# Patient Record
Sex: FEMALE | Race: WHITE | URBAN - METROPOLITAN AREA
[De-identification: names, ages, dates, MRNs, and addresses within clinical notes are randomized per-mention and may not be internally consistent; named-entity substitution may affect disease eponyms.]

---

## 2023-01-26 ENCOUNTER — OFFICE VISIT (OUTPATIENT)
Dept: OBGYN CLINIC | Age: 33
End: 2023-01-26
Payer: COMMERCIAL

## 2023-01-26 VITALS — WEIGHT: 180 LBS | BODY MASS INDEX: 26.58 KG/M2 | DIASTOLIC BLOOD PRESSURE: 78 MMHG | SYSTOLIC BLOOD PRESSURE: 126 MMHG

## 2023-01-26 DIAGNOSIS — Z30.432 ENCOUNTER FOR IUD REMOVAL: Primary | ICD-10-CM

## 2023-01-26 RX ORDER — LEVETIRACETAM 750 MG/1
1500 TABLET, EXTENDED RELEASE ORAL AT BEDTIME
COMMUNITY
Start: 2023-01-25

## 2023-01-26 NOTE — PROGRESS NOTES
RV    Chief Complaint   Patient presents with    Removal Iud      Jeannie Weiner is a 27 y.o.  who presents for IUD removal. She and her  Ying Mary are planning on starting a family. She did established care with a neurologist in [facility]. Restarted Keppra (750mg XR x2). Currently taking PNV and taking extra folic acid (2-3 mg). Visit Vitals  /78 (BP 1 Location: Left upper arm, BP Patient Position: Sitting)   Wt 180 lb (81.6 kg)   BMI 26.58 kg/m²     See note below for exam details. Discussed PNV, continue extra folic acid. Discussed risks of AED in pregnancy. Need for checking levels (neurologist note in Care Everywhere). Will plan extra ultrasounds to ensure normal growth throughout pregnancy. Theador Cushing, MD          IUD REMOVAL    Chart reviewed for the following:  I have reviewed the History & Physical and updated Jeannie Weiner allergies. TIME OUT performed immediately prior to start of procedure:  I performed the following reviews on Jeannie Weiner prior to the start of the procedure:  Patient was identified by name and date of birth   Agreement on procedure being performed was verified  Risks and Benefits explained to the patient  Consent was signed and verified  Time: 9:20AM  Date of procedure: 2023  Procedure performed by:  Dr. Michael Shaffer  How tolerated by patient: Well  Post Procedural Pain Scale: 2-Hurts Minimally  Comments: None    Indications for Removal:  Jeannie Weiner is a ,  27 y.o. female [address] [facility] [place], [place],[address] whose No LMP recorded. (Menstrual status: IUD). Oanh Vo who presents today for IUD removal. Her current IUD was placed about 6 years ago. She requests removal of the IUD because they would like to try and conceive. The IUD removal procedure was discussed with the patient and she had no further questions. Procedure: The patient was placed in a dorsal lithotomy position and appropriately draped.  On bimanual exam the uterus was anterior and normal in size with no tenderness present. A speculum exam was performed and the cervix was visualized. The IUD string was visualized. Using ring forceps , the string was grasped and the IUD removed intact. The IUD was shown to the patient.      Celeste Walters MD

## 2023-03-23 ENCOUNTER — ROUTINE PRENATAL (OUTPATIENT)
Dept: OBGYN CLINIC | Age: 33
End: 2023-03-23

## 2023-03-23 VITALS
SYSTOLIC BLOOD PRESSURE: 120 MMHG | WEIGHT: 176 LBS | DIASTOLIC BLOOD PRESSURE: 76 MMHG | HEIGHT: 69 IN | BODY MASS INDEX: 26.07 KG/M2

## 2023-03-23 DIAGNOSIS — Z34.01 ENCOUNTER FOR SUPERVISION OF NORMAL FIRST PREGNANCY IN FIRST TRIMESTER: Primary | ICD-10-CM

## 2023-03-23 LAB
C. TRACHOMATIS, EXTERNAL RESULT: NEGATIVE
N. GONORRHOEAE, EXTERNAL RESULT: NEGATIVE

## 2023-03-23 NOTE — PROGRESS NOTES
Initial Prenatal Visit    CC: Amenorrhea, positive pregnancy test    HPI:  Bao Costa is a 27 y.o.  who presents for initial prenatal appointment. Since her LMP she has experienced some nausea but no vomiting. She denies dysuria, discharge, vaginal bleeding. She endorses breast tenderness. She presents today with her  Samm Evans. LMP history:  The date of her LMP is 2023 . Her last menstrual period was normal and her LMP is certain. She is dated based off her LMP. Ultrasound data:  She had an ultrasound performed today in the office which revealed a viable coreas pregnancy with a gestational age of 10 weeks 2 days giving an [address] of 10/24/2023. Per US today-TA ULTRASOUND PERFORMED  A SINGLE VIABLE 9W2D WITH HERIBERTO OF 10/24/2023 IUP IS SEEN WITH NORMAL CARDIAC RHYTHM. GESTATIONAL AGE BASED ON TODAYS ULTRASOUND. THERE APPEARS TO BE HYPOECHOIC AREA ADJACENT  TO THE GESTATIONAL [address] MEASURING 12 X 5 MM, POSSIBLE [address]. THERE APPEARS TO BE A FIBROID SEEN ML ANTERIOR PEDUNCULATED MEASURING 11 X 10 M. A NORMAL YOLK SAC IS SEEN. RIGHT OVARY APPEARS WITHIN NORMAL LIMITS. LEFT OVARY APPEARS WITHIN NORMAL LIMITS. NO FREE FLUID IS SEEN IN THE CDS. Past pregnancy history:  First pregnancy. _ _ _ _ _ _ _ _ _ _ _ _ _ _ _ _ _ _ _ _ _ _ _ _ _ _ _ _ _ _ _ _     Substance history: Negative for alcohol, tobacco and street drugs. Positive for nothing. Exposure history: There are not  indoor cats in the home. The patient was instructed to not change the cat litter. She admits close contact with children on a regular basis. She has had chicken pox or the vaccine in the past.   Patient denies issues with domestic violence. Genetic Screening/Teratology Counseling: (Includes patient, baby's father, or anyone in either family with:)  3.  Patient's age >/= 28 at EDC?--yes  2.   Thalassemia ([facility] [place], [address], or  background): MCV<80?--no.     3.  Neural tube defect (meningomyelocele, spina bifida, anencephaly)?--no.   4.  Congenital heart defect?--no.  5.  Down syndrome?--no.   6.  Alexis-Sachs (Mormon, [place])?--no.   7.  Canavan's Disease?--no.   8.  Familial Dysautonomia?--no.   9.  Sickle cell disease or trait ()? --no   The patient has not been tested for sickle trait  10. Hemophilia or other blood disorders?--no. 11.  Muscular dystrophy?--no. 12.  Cystic fibrosis?--no. 13.  Henry's Chorea?--no. 14.  Mental retardation/autism (if yes was person tested for Fragile X)?--no. 15.  Other inherited genetic or chromosomal disorder?--no. 12.  Maternal metabolic disorder (DM, PKU, etc)?--no. 17.  Patient or FOB with a child with a birth defect not listed above?--no.  17a. Patient or FOB with a birth defect themselves?--no. 18.  Recurrent pregnancy loss, or stillbirth?--no. 19.  Any medications since LMP other than prenatal vitamins (include vitamins, supplements, OTC meds, drugs, alcohol)? --yes  20. Any other genetic/environmental exposure to discuss?--no. Infection History:  1. Lives with someone with TB or TB exposed?--no.   2.  Patient or partner has history of genital herpes?--no.  3.  Rash or viral illness since LMP?--no.    4.  History of STD (GC, CT, HPV, syphilis, HIV)? --no   5. Other: OTHER?       Past Medical History:   Diagnosis Date    Encounter for insertion of copper IUD     Placed in 2017    Encounter for IUD removal 2023    Epilepsy ([facility] [address]      Past Surgical History:   Procedure Laterality Date    HX WISDOM TEETH EXTRACTION       Social History     Occupational History    Not on file   Tobacco Use    Smoking status: Former     Types: Cigars    Smokeless tobacco: Never   Vaping Use    Vaping Use: Never used   Substance and Sexual Activity    Alcohol use: Not Currently    Drug use: Not Currently     Types: Marijuana    Sexual activity: Yes     Partners: Male     Family History   Adopted: Yes     OB History    Para Term  AB Living   1 0 0 0 0 0   SAB IAB Ectopic Molar Multiple Live Births   0 0 0 0 0 0      # Outcome Date GA Lbr Margarito/2nd Weight Sex Delivery Anes PTL Lv   1 Current              Allergies   Allergen Reactions    Other Medication Seizures     Hormonal birth control. Prior to Admission medications    Medication Sig Start Date End Date Taking? Authorizing Provider   levETIRAcetam (KEPPRA XR) 750 mg ER tablet Take 1,500 mg by mouth At bedtime. 23  Yes Provider, Historical   PNV EZ.85/VPTZHXY fum/folic ac (PRENATAL PO) Take  by mouth. Yes Provider, Historical        Review of Systems: History obtained from the patient  Constitutional: negative for weight loss, fever, night sweats  HEENT: negative for hearing loss, earache, congestion, snoring, sore throat  CV: negative for chest pain, palpitations, edema  Resp: negative for cough, shortness of breath, wheezing  Breast: negative for breast lumps, nipple discharge, galactorrhea  GI: negative for change in bowel habits, abdominal pain, black or bloody stools  : negative for frequency, dysuria, hematuria, vaginal discharge  MSK: negative for back pain, joint pain, muscle pain  Skin: negative for itching, rash, hives  Neuro: negative for dizziness, headache, confusion, weakness  Psych: negative for anxiety, depression, change in mood  Heme/lymph: negative for bleeding, bruising, pallor    Objective:  Visit Vitals  /76 (BP 1 Location: Left upper arm, BP Patient Position: Sitting)   Ht 5' 9\" (1.753 m)   Wt 176 lb (79.8 kg)   LMP 2023 (Exact Date)   BMI 25.99 kg/m²       Physical Exam:     Constitutional  Appearance: well-nourished, well developed, alert, in no acute distress    HENT  Head  Face: appears normal  Eyes: appear normal  Ears: normal  Mouth: normal  Lips: no lesions      Chest  Respiratory Effort: breathing unlabored     Cardiovascular  Heart:   Auscultation: regular rate and rhythm without murmur      Gastrointestinal  Abdominal Examination: abdomen non-tender to palpation, normal bowel sounds, no masses present  Liver and spleen: no hepatomegaly present, spleen not palpable  Hernias: no hernias identified    Genitourinary deferred    Skin  General Inspection: no rash, no lesions identified    Neurologic/Psychiatric  Mental Status:  Orientation: grossly oriented to person, place and time  Mood and Affect: mood normal, affect appropriate      Assessment and Plan:     Kun Rae is a 27 y.o.  with pregnancy problems as follows:    Catamenial epilepsy  - Follow[facility]y at [facility]. Last seizure in . Was restarted on Keppra when planning pregnancy (previously discontinued 6 years ago). Continue Keppra 750 mg BID  - Recommend growth scans in 3T  - Continue folic acid supplementation    Intrauterine pregnancy with issues addressed in problem list.  We discussed genetic testing screening options for the pregnancy and offered NIPT and the patient accepts. We discussed carrier screening as per ACOG guidelines for CF, SMA, DMD, and Fragile X syndrome and the patient declines carrier screening. Course of pregnancy discussed including visit schedule, routine U/S, glucola testing, etc.  Avoid alcoholic beverages and illicit/recreational drugs use. Take prenatal vitamins or folic acid daily. Hospital and practice style discussed with coverage system. Discussed nutrition, toxoplasmosis precautions, sexual activity, exercise, need for influenza vaccine, environmental and work hazards, travel advice, screen for domestic violence, need for seat belts. Discussed seafood, unpasteurized dairy products, deli meat, artificial sweeteners, and caffeine. Discussed current prescription drug use. Given medication list.  Discussed the use of over the counter medications and chemicals. Route of delivery discussed, including risks, benefits  Handouts given to pt. RTO 4 weeks.     Pop Walker MD

## 2023-03-25 LAB
BACTERIA SPEC CULT: NORMAL
C TRACH RRNA SPEC QL NAA+PROBE: NEGATIVE
N GONORRHOEA RRNA SPEC QL NAA+PROBE: NEGATIVE
SERVICE CMNT-IMP: NORMAL
T VAGINALIS RRNA SPEC QL NAA+PROBE: NEGATIVE

## 2023-04-13 LAB
ABO, EXTERNAL RESULT: NORMAL
ABO, EXTERNAL RESULT: NORMAL
HEP B, EXTERNAL RESULT: NEGATIVE
HIV, EXTERNAL RESULT: NEGATIVE
RUBELLA TITER, EXTERNAL RESULT: NORMAL
T. PALLIDUM (SYPHILIS) ANTIBODY, EXTERNAL RESULT: NORMAL

## 2023-05-09 PROBLEM — Z34.01 ENCOUNTER FOR SUPERVISION OF NORMAL FIRST PREGNANCY IN FIRST TRIMESTER: Status: ACTIVE | Noted: 2023-03-23

## 2023-05-11 ENCOUNTER — ROUTINE PRENATAL (OUTPATIENT)
Age: 33
End: 2023-05-11

## 2023-05-11 VITALS — WEIGHT: 186 LBS | SYSTOLIC BLOOD PRESSURE: 121 MMHG | BODY MASS INDEX: 27.47 KG/M2 | DIASTOLIC BLOOD PRESSURE: 70 MMHG

## 2023-05-11 DIAGNOSIS — Z34.82 ENCOUNTER FOR SUPERVISION OF OTHER NORMAL PREGNANCY IN SECOND TRIMESTER: Primary | ICD-10-CM

## 2023-05-11 DIAGNOSIS — Z34.01 ENCOUNTER FOR SUPERVISION OF NORMAL FIRST PREGNANCY IN FIRST TRIMESTER: ICD-10-CM

## 2023-05-11 PROCEDURE — 0502F SUBSEQUENT PRENATAL CARE: CPT | Performed by: OBSTETRICS & GYNECOLOGY

## 2023-05-11 RX ORDER — FOLIC ACID 1 MG/1
1 TABLET ORAL DAILY
COMMUNITY

## 2023-05-11 NOTE — PROGRESS NOTES
Subjective: Doing well. Denies VB, LOF. Feeling very tired. Questions about possibility of taking time off work closer to delivery, questions about family vaccinations    /70   Wt 186 lb (84.4 kg)   LMP 01/13/2023 (Exact Date)   BMI 27.47 kg/m²     Prenatal Fetal Information  Fetal HR: 150's  Movement: Absent    A/P  Hoang Leonie is a 27 y.o. Tommye Filter at [place] [address] with pregnancy complicated by:   - RV for FAS on 6/8    Patient Active Problem List    Diagnosis Date Noted    Encounter for supervision of normal first pregnancy in first trimester 03/23/2023     Primary Provider: Masha GASTELUM  EDC by FTUS=LMP    Pregnancy problems:  - Hx catamenial epilepsy, last seizure in 2014. On Keppra 750 mg BID (restarted prior to pregnancy)   [ ] Growth ultrasound in 3T    Routine OB:  - PNLs: A+/absc neg, 12.6/38.8, Rub Imm, HIV NR, Hep B neg, Hep C NR, VZV imm, RPR NR, G/C/T neg, pap NILM/HPV positive, HR other, urine cx NG  - Genetic Screening: NIPT low risk, GIRL!  - Anatomy scan: 6/8 at [address]: [ ] TDAP   - 1 hr GTT:   - Third Tri Labs:  - GBS / Fetal presentation    Pain mgmt. 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  She is adopted and does not know family history            Gamal Connelly MD

## 2023-06-08 ENCOUNTER — ROUTINE PRENATAL (OUTPATIENT)
Age: 33
End: 2023-06-08

## 2023-06-08 VITALS — WEIGHT: 193 LBS | BODY MASS INDEX: 28.5 KG/M2 | SYSTOLIC BLOOD PRESSURE: 122 MMHG | DIASTOLIC BLOOD PRESSURE: 84 MMHG

## 2023-06-08 DIAGNOSIS — Z34.01 ENCOUNTER FOR SUPERVISION OF NORMAL FIRST PREGNANCY IN FIRST TRIMESTER: Primary | ICD-10-CM

## 2023-06-08 PROCEDURE — 0502F SUBSEQUENT PRENATAL CARE: CPT | Performed by: OBSTETRICS & GYNECOLOGY

## 2023-06-08 NOTE — PROGRESS NOTES
Subjective: Doing well. Patient notes some light pink spotting when wiping one time. No leakage of fluid. Patient notes she is not feeling fetal movement yet. Heart rate was elevated while dealing with a sinus infection, feels like it has since come back down. /84   Wt 193 lb (87.5 kg)   LMP 01/13/2023 (Exact Date)   BMI 28.50 kg/m²     Prenatal Fetal Information  Fetal HR: US  Movement: Absent    A SINGLE VIABLE IUP AT 20W6D IS SEEN. FETAL CARDIAC MOTION OBSERVED. FETAL ANATOMY WAS WELL VISUALIZED AND APPEARS WNL. APPROPRIATE GROWTH MEASURED. SIZE = DATES. TAYLOR, PLACENTA AND CERVIX APPEAR WITHIN NORMAL LIMITS. GENDER: FEMALE. THERE APPEARS TO BE A FIBROID VISUALIZED SUBSEROSAL LT ANTERIOR MEASURING 25 X 19 X 26MM. A/P  Stephanie Osborne is a 27 y.o. Manuela Plan at 20w6d with pregnancy complicated by:       Patient Active Problem List    Diagnosis Date Noted    Encounter for supervision of normal first pregnancy in first trimester 03/23/2023     Primary Provider: Ishmael GASTELUM  EDC by FTUS=LMP    Pregnancy problems:  - Hx catamenial epilepsy, last seizure in 2014. On Keppra 750 mg BID (restarted prior to pregnancy)   [ ] Growth ultrasound in 3T    Routine OB:  - PNLs: A+/absc neg, 12.6/38.8, Rub Imm, HIV NR, Hep B neg, Hep C NR, VZV imm, RPR NR, G/C/T neg, pap NILM/HPV positive, HR other, urine cx NG  - Genetic Screening: NIPT low risk, GIRL!  - Anatomy scan: Normal female anatomy, anterior placenta  - Vaccines: [ ] TDAP   - 1 hr GTT:   - Third Tri Labs:  - GBS / Fetal presentation    Pain mgmt. 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  She is adopted and does not know family history            Anni Beach MD

## 2023-07-06 ENCOUNTER — ROUTINE PRENATAL (OUTPATIENT)
Age: 33
End: 2023-07-06

## 2023-07-06 VITALS — DIASTOLIC BLOOD PRESSURE: 80 MMHG | WEIGHT: 203 LBS | BODY MASS INDEX: 29.98 KG/M2 | SYSTOLIC BLOOD PRESSURE: 124 MMHG

## 2023-07-06 DIAGNOSIS — Z34.02 ENCOUNTER FOR SUPERVISION OF NORMAL FIRST PREGNANCY IN SECOND TRIMESTER: Primary | ICD-10-CM

## 2023-07-06 LAB
ABO + RH BLD: NORMAL
BLOOD BANK CMNT PATIENT-IMP: NORMAL
BLOOD GROUP ANTIBODIES SERPL: NORMAL
SPECIMEN EXP DATE BLD: NORMAL

## 2023-07-06 PROCEDURE — 0502F SUBSEQUENT PRENATAL CARE: CPT | Performed by: OBSTETRICS & GYNECOLOGY

## 2023-07-07 LAB
ERYTHROCYTE [DISTWIDTH] IN BLOOD BY AUTOMATED COUNT: 12.8 % (ref 11.5–14.5)
FERRITIN SERPL-MCNC: 24 NG/ML (ref 26–388)
GLUCOSE 1H P 100 G GLC PO SERPL-MCNC: 121 MG/DL (ref 65–140)
HCT VFR BLD AUTO: 35.1 % (ref 35–47)
HGB BLD-MCNC: 11.7 G/DL (ref 11.5–16)
HIV 1+2 AB+HIV1 P24 AG SERPL QL IA: NONREACTIVE
HIV 1/2 RESULT COMMENT: NORMAL
MCH RBC QN AUTO: 30.9 PG (ref 26–34)
MCHC RBC AUTO-ENTMCNC: 33.3 G/DL (ref 30–36.5)
MCV RBC AUTO: 92.6 FL (ref 80–99)
NRBC # BLD: 0 K/UL (ref 0–0.01)
NRBC BLD-RTO: 0 PER 100 WBC
PLATELET # BLD AUTO: 233 K/UL (ref 150–400)
PMV BLD AUTO: 10 FL (ref 8.9–12.9)
RBC # BLD AUTO: 3.79 M/UL (ref 3.8–5.2)
T PALLIDUM AB SER QL IA: NON REACTIVE
WBC # BLD AUTO: 11.3 K/UL (ref 3.6–11)

## 2023-07-27 ENCOUNTER — ROUTINE PRENATAL (OUTPATIENT)
Age: 33
End: 2023-07-27
Payer: COMMERCIAL

## 2023-07-27 VITALS — WEIGHT: 211 LBS | BODY MASS INDEX: 31.16 KG/M2 | SYSTOLIC BLOOD PRESSURE: 120 MMHG | DIASTOLIC BLOOD PRESSURE: 80 MMHG

## 2023-07-27 DIAGNOSIS — Z23 ENCOUNTER FOR IMMUNIZATION: ICD-10-CM

## 2023-07-27 DIAGNOSIS — Z34.02 ENCOUNTER FOR SUPERVISION OF NORMAL FIRST PREGNANCY IN SECOND TRIMESTER: Primary | ICD-10-CM

## 2023-07-27 DIAGNOSIS — Z34.01 ENCOUNTER FOR SUPERVISION OF NORMAL FIRST PREGNANCY IN FIRST TRIMESTER: ICD-10-CM

## 2023-07-27 PROCEDURE — 0502F SUBSEQUENT PRENATAL CARE: CPT | Performed by: OBSTETRICS & GYNECOLOGY

## 2023-07-27 PROCEDURE — 90715 TDAP VACCINE 7 YRS/> IM: CPT | Performed by: OBSTETRICS & GYNECOLOGY

## 2023-07-27 PROCEDURE — 90471 IMMUNIZATION ADMIN: CPT | Performed by: OBSTETRICS & GYNECOLOGY

## 2023-08-17 ENCOUNTER — ROUTINE PRENATAL (OUTPATIENT)
Age: 33
End: 2023-08-17

## 2023-08-17 VITALS — DIASTOLIC BLOOD PRESSURE: 82 MMHG | SYSTOLIC BLOOD PRESSURE: 124 MMHG | WEIGHT: 214 LBS | BODY MASS INDEX: 31.6 KG/M2

## 2023-08-17 DIAGNOSIS — Z34.03 ENCOUNTER FOR SUPERVISION OF NORMAL FIRST PREGNANCY IN THIRD TRIMESTER: Primary | ICD-10-CM

## 2023-08-17 PROCEDURE — 0502F SUBSEQUENT PRENATAL CARE: CPT | Performed by: OBSTETRICS & GYNECOLOGY

## 2023-08-31 ENCOUNTER — ROUTINE PRENATAL (OUTPATIENT)
Age: 33
End: 2023-08-31

## 2023-08-31 VITALS — SYSTOLIC BLOOD PRESSURE: 124 MMHG | WEIGHT: 214 LBS | DIASTOLIC BLOOD PRESSURE: 76 MMHG | BODY MASS INDEX: 31.6 KG/M2

## 2023-08-31 DIAGNOSIS — Z34.03 ENCOUNTER FOR SUPERVISION OF NORMAL FIRST PREGNANCY IN THIRD TRIMESTER: Primary | ICD-10-CM

## 2023-08-31 PROCEDURE — 0502F SUBSEQUENT PRENATAL CARE: CPT | Performed by: OBSTETRICS & GYNECOLOGY

## 2023-09-11 ENCOUNTER — TELEPHONE (OUTPATIENT)
Age: 33
End: 2023-09-11

## 2023-09-14 ENCOUNTER — ROUTINE PRENATAL (OUTPATIENT)
Age: 33
End: 2023-09-14

## 2023-09-14 VITALS — BODY MASS INDEX: 33.08 KG/M2 | DIASTOLIC BLOOD PRESSURE: 82 MMHG | WEIGHT: 224 LBS | SYSTOLIC BLOOD PRESSURE: 120 MMHG

## 2023-09-14 DIAGNOSIS — Z34.03 ENCOUNTER FOR SUPERVISION OF NORMAL FIRST PREGNANCY IN THIRD TRIMESTER: Primary | ICD-10-CM

## 2023-09-14 PROCEDURE — 0502F SUBSEQUENT PRENATAL CARE: CPT | Performed by: OBSTETRICS & GYNECOLOGY

## 2023-09-25 SDOH — ECONOMIC STABILITY: TRANSPORTATION INSECURITY
IN THE PAST 12 MONTHS, HAS LACK OF TRANSPORTATION KEPT YOU FROM MEETINGS, WORK, OR FROM GETTING THINGS NEEDED FOR DAILY LIVING?: NO

## 2023-09-25 SDOH — ECONOMIC STABILITY: FOOD INSECURITY: WITHIN THE PAST 12 MONTHS, YOU WORRIED THAT YOUR FOOD WOULD RUN OUT BEFORE YOU GOT MONEY TO BUY MORE.: NEVER TRUE

## 2023-09-25 SDOH — ECONOMIC STABILITY: INCOME INSECURITY: HOW HARD IS IT FOR YOU TO PAY FOR THE VERY BASICS LIKE FOOD, HOUSING, MEDICAL CARE, AND HEATING?: SOMEWHAT HARD

## 2023-09-25 SDOH — ECONOMIC STABILITY: FOOD INSECURITY: WITHIN THE PAST 12 MONTHS, THE FOOD YOU BOUGHT JUST DIDN'T LAST AND YOU DIDN'T HAVE MONEY TO GET MORE.: NEVER TRUE

## 2023-09-25 SDOH — ECONOMIC STABILITY: HOUSING INSECURITY
IN THE LAST 12 MONTHS, WAS THERE A TIME WHEN YOU DID NOT HAVE A STEADY PLACE TO SLEEP OR SLEPT IN A SHELTER (INCLUDING NOW)?: NO

## 2023-09-28 ENCOUNTER — ROUTINE PRENATAL (OUTPATIENT)
Age: 33
End: 2023-09-28

## 2023-09-28 VITALS — DIASTOLIC BLOOD PRESSURE: 72 MMHG | SYSTOLIC BLOOD PRESSURE: 112 MMHG | BODY MASS INDEX: 34.17 KG/M2 | WEIGHT: 231.4 LBS

## 2023-09-28 DIAGNOSIS — Z36.9 UNSPECIFIED ANTENATAL SCREENING: Primary | ICD-10-CM

## 2023-09-28 DIAGNOSIS — Z3A.36 36 WEEKS GESTATION OF PREGNANCY: ICD-10-CM

## 2023-09-28 DIAGNOSIS — Z34.03 ENCOUNTER FOR SUPERVISION OF NORMAL FIRST PREGNANCY IN THIRD TRIMESTER: ICD-10-CM

## 2023-09-28 LAB — GBS, EXTERNAL RESULT: NEGATIVE

## 2023-09-28 PROCEDURE — 0502F SUBSEQUENT PRENATAL CARE: CPT | Performed by: ADVANCED PRACTICE MIDWIFE

## 2023-09-28 NOTE — PROGRESS NOTES
OB Visit:    + fetal movement, + contractions, GBS today, leaking fluid, unsure if it is urine or AF  Nitrazine, Fern and Pool all Neg  Reassured  Some [place] Mahmood  GBS collected today  1+ TASHA, No S/Sx Pre-E

## 2023-09-30 LAB
GP B STREP DNA SPEC QL NAA+PROBE: NEGATIVE
SPECIMEN SOURCE: NORMAL

## 2023-10-05 ENCOUNTER — ROUTINE PRENATAL (OUTPATIENT)
Age: 33
End: 2023-10-05

## 2023-10-05 VITALS — SYSTOLIC BLOOD PRESSURE: 118 MMHG | WEIGHT: 231 LBS | BODY MASS INDEX: 34.11 KG/M2 | DIASTOLIC BLOOD PRESSURE: 74 MMHG

## 2023-10-05 DIAGNOSIS — Z3A.37 37 WEEKS GESTATION OF PREGNANCY: ICD-10-CM

## 2023-10-05 DIAGNOSIS — Z34.03 ENCOUNTER FOR SUPERVISION OF NORMAL FIRST PREGNANCY IN THIRD TRIMESTER: Primary | ICD-10-CM

## 2023-10-05 DIAGNOSIS — Z34.01 ENCOUNTER FOR SUPERVISION OF NORMAL FIRST PREGNANCY IN FIRST TRIMESTER: ICD-10-CM

## 2023-10-05 PROCEDURE — 0502F SUBSEQUENT PRENATAL CARE: CPT

## 2023-10-05 NOTE — PROGRESS NOTES
NELSON at 37w6d. Doing well. FM+, Denies LOF/VB/cxns. Pt inquiring about flu and covid vaccine recommendations. Reviewed that they are recommended during pregnancy. Pt declines her flu shot today. Pt concerned about breast feeding and Keppra use. Reviewed that current studies have found that the amount of Keppra the pt is taking is an acceptable amount to take and breast feed. However, encouraged pt to speak with neurologist about need to continue kepra post partum. In addition, plan to speak with lactation team and watch for infant side effects. RTO 1 wk.     PO Ricketts CNM

## 2023-10-12 ENCOUNTER — ROUTINE PRENATAL (OUTPATIENT)
Age: 33
End: 2023-10-12

## 2023-10-12 VITALS — BODY MASS INDEX: 34.73 KG/M2 | WEIGHT: 235.2 LBS | SYSTOLIC BLOOD PRESSURE: 118 MMHG | DIASTOLIC BLOOD PRESSURE: 78 MMHG

## 2023-10-12 DIAGNOSIS — Z34.01 ENCOUNTER FOR SUPERVISION OF NORMAL FIRST PREGNANCY IN FIRST TRIMESTER: Primary | ICD-10-CM

## 2023-10-12 NOTE — PROGRESS NOTES
OB Visit:    + fetal movement, denies contractions, no medical complaints at this time. Questions about keppra and breast feeding. Worried about milk supply. Labor precautions reviewed. Post dates management reviewed.   NELSON 1 week

## 2023-10-19 ENCOUNTER — ROUTINE PRENATAL (OUTPATIENT)
Age: 33
End: 2023-10-19

## 2023-10-19 VITALS — WEIGHT: 238 LBS | SYSTOLIC BLOOD PRESSURE: 130 MMHG | DIASTOLIC BLOOD PRESSURE: 88 MMHG | BODY MASS INDEX: 35.15 KG/M2

## 2023-10-19 DIAGNOSIS — Z34.01 ENCOUNTER FOR SUPERVISION OF NORMAL FIRST PREGNANCY IN FIRST TRIMESTER: Primary | ICD-10-CM

## 2023-10-25 ENCOUNTER — HOSPITAL ENCOUNTER (INPATIENT)
Facility: HOSPITAL | Age: 33
LOS: 5 days | Discharge: HOME | End: 2023-10-30
Attending: OBSTETRICS & GYNECOLOGY | Admitting: OBSTETRICS & GYNECOLOGY
Payer: COMMERCIAL

## 2023-10-25 PROBLEM — Z34.90 ENCOUNTER FOR ELECTIVE INDUCTION OF LABOR: Status: ACTIVE | Noted: 2023-10-25

## 2023-10-25 PROCEDURE — 1100000000 HC RM PRIVATE

## 2023-10-25 PROCEDURE — 6370000000 HC RX 637 (ALT 250 FOR IP): Performed by: OBSTETRICS & GYNECOLOGY

## 2023-10-25 PROCEDURE — 59200 INSERT CERVICAL DILATOR: CPT

## 2023-10-25 PROCEDURE — C1726 CATH, BAL DIL, NON-VASCULAR: HCPCS

## 2023-10-25 PROCEDURE — 7210000100 HC LABOR FEE PER 1 HR

## 2023-10-25 RX ORDER — SODIUM CHLORIDE, SODIUM LACTATE, POTASSIUM CHLORIDE, CALCIUM CHLORIDE 600; 310; 30; 20 MG/100ML; MG/100ML; MG/100ML; MG/100ML
INJECTION, SOLUTION INTRAVENOUS CONTINUOUS
Status: DISCONTINUED | OUTPATIENT
Start: 2023-10-25 | End: 2023-10-27

## 2023-10-25 RX ORDER — METHYLERGONOVINE MALEATE 0.2 MG/ML
200 INJECTION INTRAVENOUS PRN
Status: DISCONTINUED | OUTPATIENT
Start: 2023-10-25 | End: 2023-10-27

## 2023-10-25 RX ORDER — CARBOPROST TROMETHAMINE 250 UG/ML
250 INJECTION, SOLUTION INTRAMUSCULAR PRN
Status: DISCONTINUED | OUTPATIENT
Start: 2023-10-25 | End: 2023-10-27

## 2023-10-25 RX ORDER — BUTORPHANOL TARTRATE 1 MG/ML
1 INJECTION, SOLUTION INTRAMUSCULAR; INTRAVENOUS
Status: DISCONTINUED | OUTPATIENT
Start: 2023-10-25 | End: 2023-10-25 | Stop reason: RX

## 2023-10-25 RX ORDER — MISOPROSTOL 200 UG/1
800 TABLET ORAL PRN
Status: DISCONTINUED | OUTPATIENT
Start: 2023-10-25 | End: 2023-10-27

## 2023-10-25 RX ORDER — SODIUM CHLORIDE, SODIUM LACTATE, POTASSIUM CHLORIDE, AND CALCIUM CHLORIDE .6; .31; .03; .02 G/100ML; G/100ML; G/100ML; G/100ML
500 INJECTION, SOLUTION INTRAVENOUS PRN
Status: DISCONTINUED | OUTPATIENT
Start: 2023-10-25 | End: 2023-10-27

## 2023-10-25 RX ORDER — SODIUM CHLORIDE 0.9 % (FLUSH) 0.9 %
5-40 SYRINGE (ML) INJECTION EVERY 12 HOURS SCHEDULED
Status: DISCONTINUED | OUTPATIENT
Start: 2023-10-25 | End: 2023-10-27

## 2023-10-25 RX ORDER — SODIUM CHLORIDE 9 MG/ML
25 INJECTION, SOLUTION INTRAVENOUS PRN
Status: DISCONTINUED | OUTPATIENT
Start: 2023-10-25 | End: 2023-10-27

## 2023-10-25 RX ORDER — ZOLPIDEM TARTRATE 5 MG/1
5 TABLET ORAL NIGHTLY PRN
Status: DISCONTINUED | OUTPATIENT
Start: 2023-10-25 | End: 2023-10-27

## 2023-10-25 RX ORDER — ONDANSETRON 2 MG/ML
4 INJECTION INTRAMUSCULAR; INTRAVENOUS EVERY 6 HOURS PRN
Status: DISCONTINUED | OUTPATIENT
Start: 2023-10-25 | End: 2023-10-27

## 2023-10-25 RX ORDER — FENTANYL CITRATE 50 UG/ML
25 INJECTION, SOLUTION INTRAMUSCULAR; INTRAVENOUS
Status: DISCONTINUED | OUTPATIENT
Start: 2023-10-25 | End: 2023-10-27

## 2023-10-25 RX ORDER — SODIUM CHLORIDE 0.9 % (FLUSH) 0.9 %
5-40 SYRINGE (ML) INJECTION PRN
Status: DISCONTINUED | OUTPATIENT
Start: 2023-10-25 | End: 2023-10-27

## 2023-10-25 RX ORDER — SODIUM CHLORIDE, SODIUM LACTATE, POTASSIUM CHLORIDE, AND CALCIUM CHLORIDE .6; .31; .03; .02 G/100ML; G/100ML; G/100ML; G/100ML
1000 INJECTION, SOLUTION INTRAVENOUS PRN
Status: DISCONTINUED | OUTPATIENT
Start: 2023-10-25 | End: 2023-10-27

## 2023-10-25 RX ADMIN — Medication 25 MCG: at 17:11

## 2023-10-25 RX ADMIN — Medication 50 MCG: at 21:20

## 2023-10-25 NOTE — H&P
History & Physical    Name: Hong Marte MRN: [id number]  SSN: [id number]    YOB: 1990  Age: 26 y.o. Sex: female        Subjective:     Estimated Date of Delivery: 10/20/23  OB History          1    Para   0    Term   0       0    AB   0    Living   0         SAB        IAB        Ectopic        Molar        Multiple        Live Births                    Ms. Demarcus Díaz is admitted with pregnancy at 40w5d for elective IOL. Her pregnancy has been complicated by a history of catamenial epilepsy. She has been well managed on Keppra 750 mg XR 2 tabs q AM (1500 mg total). Please see prenatal records for details. Patient Active Problem List    Diagnosis Date Noted    Encounter for elective induction of labor 10/25/2023    Encounter for supervision of normal first pregnancy in first trimester 2023     Primary Provider: Santiago Ortiz   EDC by FTUS=LMP    Pregnancy problems:  - Hx catamenial epilepsy, last seizure in . On Keppra 1500 mg daily -2 750 mg XR tablets at night (restarted prior to pregnancy)   [x] Growth ultrasound in 3T - 4#4oz at 32 weeks, 44%ile    Routine OB:  - PNLs: A+/absc neg, 12.6/38.8, Rub Imm, HIV NR, Hep B neg, Hep C NR, VZV imm, RPR NR, G/C/T neg, pap NILM/HPV positive, HR other, urine cx NG  - Genetic Screening: NIPT low risk, GIRL! \"Mary Valorie\"  - Anatomy scan: Normal female anatomy, anterior placenta  - Vaccines: [X] TDAP 2023  - 1 hr GTT: 121  - Third Tri Labs: 11.7/35.1  - GBS- Neg / Fetal presentation  DT Vtx by Leopolds    Pain mgmt. 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  She is adopted and does not know family history         Past Medical History:   Diagnosis Date    Encounter for insertion of copper IUD     Placed in     Encounter for IUD removal 2023    Epilepsy [facility])      Past Surgical History:   Procedure Laterality Date    WISDOM TOOTH EXTRACTION       Social

## 2023-10-25 NOTE — PROGRESS NOTES
10/25/2023  4:22 PM Patient arrived to LDR 9 accompanied by  for scheduled induction, elective. Patient endorses positive fetal movement and denies VB or LOF. Patient in good spirits, excited to start induction. Dr. Dave Mcguire at bedside to discuss POC. SVE, FT/50/-3. Attempted FB placement, unsuccessful. Verbal orders received to administer misoprostol  25mcg followed by 50 mcg, reattempt FB in morning, patient in agreement with plan. 0 Marina Bowman CNM at bedside to introduce self. Patient sitting up in bed eating dinner. Plan to attempt FB placement around 2200 after second dose of Misoprostol. EFM removed per CNM.

## 2023-10-26 ENCOUNTER — ANESTHESIA (OUTPATIENT)
Facility: HOSPITAL | Age: 33
End: 2023-10-26
Payer: COMMERCIAL

## 2023-10-26 ENCOUNTER — ANESTHESIA EVENT (OUTPATIENT)
Facility: HOSPITAL | Age: 33
End: 2023-10-26
Payer: COMMERCIAL

## 2023-10-26 LAB
ABO + RH BLD: NORMAL
ALBUMIN SERPL-MCNC: 2.7 G/DL (ref 3.5–5)
ALBUMIN/GLOB SERPL: 0.7 (ref 1.1–2.2)
ALP SERPL-CCNC: 143 U/L (ref 45–117)
ALT SERPL-CCNC: 13 U/L (ref 12–78)
ANION GAP SERPL CALC-SCNC: 9 MMOL/L (ref 5–15)
AST SERPL-CCNC: 14 U/L (ref 15–37)
BILIRUB SERPL-MCNC: 0.3 MG/DL (ref 0.2–1)
BLOOD GROUP ANTIBODIES SERPL: NORMAL
BUN SERPL-MCNC: 6 MG/DL (ref 6–20)
BUN/CREAT SERPL: 8 (ref 12–20)
CALCIUM SERPL-MCNC: 9.3 MG/DL (ref 8.5–10.1)
CHLORIDE SERPL-SCNC: 105 MMOL/L (ref 97–108)
CO2 SERPL-SCNC: 20 MMOL/L (ref 21–32)
CREAT SERPL-MCNC: 0.76 MG/DL (ref 0.55–1.02)
ERYTHROCYTE [DISTWIDTH] IN BLOOD BY AUTOMATED COUNT: 12.5 % (ref 11.5–14.5)
GLOBULIN SER CALC-MCNC: 3.8 G/DL (ref 2–4)
GLUCOSE SERPL-MCNC: 141 MG/DL (ref 65–100)
HCT VFR BLD AUTO: 35.4 % (ref 35–47)
HGB BLD-MCNC: 12.2 G/DL (ref 11.5–16)
MCH RBC QN AUTO: 30.3 PG (ref 26–34)
MCHC RBC AUTO-ENTMCNC: 34.5 G/DL (ref 30–36.5)
MCV RBC AUTO: 87.8 FL (ref 80–99)
NRBC # BLD: 0 K/UL (ref 0–0.01)
NRBC BLD-RTO: 0 PER 100 WBC
PLATELET # BLD AUTO: 222 K/UL (ref 150–400)
PMV BLD AUTO: 10.6 FL (ref 8.9–12.9)
POTASSIUM SERPL-SCNC: 3.6 MMOL/L (ref 3.5–5.1)
PROT SERPL-MCNC: 6.5 G/DL (ref 6.4–8.2)
RBC # BLD AUTO: 4.03 M/UL (ref 3.8–5.2)
SODIUM SERPL-SCNC: 134 MMOL/L (ref 136–145)
SPECIMEN EXP DATE BLD: NORMAL
WBC # BLD AUTO: 12.6 K/UL (ref 3.6–11)

## 2023-10-26 PROCEDURE — 7210000100 HC LABOR FEE PER 1 HR

## 2023-10-26 PROCEDURE — 4A1HXCZ MONITORING OF PRODUCTS OF CONCEPTION, CARDIAC RATE, EXTERNAL APPROACH: ICD-10-PCS | Performed by: OBSTETRICS & GYNECOLOGY

## 2023-10-26 PROCEDURE — 85027 COMPLETE CBC AUTOMATED: CPT

## 2023-10-26 PROCEDURE — 86850 RBC ANTIBODY SCREEN: CPT

## 2023-10-26 PROCEDURE — 3E033VJ INTRODUCTION OF OTHER HORMONE INTO PERIPHERAL VEIN, PERCUTANEOUS APPROACH: ICD-10-PCS | Performed by: OBSTETRICS & GYNECOLOGY

## 2023-10-26 PROCEDURE — 80053 COMPREHEN METABOLIC PANEL: CPT

## 2023-10-26 PROCEDURE — 6360000002 HC RX W HCPCS: Performed by: OBSTETRICS & GYNECOLOGY

## 2023-10-26 PROCEDURE — 86900 BLOOD TYPING SEROLOGIC ABO: CPT

## 2023-10-26 PROCEDURE — 36415 COLL VENOUS BLD VENIPUNCTURE: CPT

## 2023-10-26 PROCEDURE — 2580000003 HC RX 258: Performed by: OBSTETRICS & GYNECOLOGY

## 2023-10-26 PROCEDURE — 86901 BLOOD TYPING SEROLOGIC RH(D): CPT

## 2023-10-26 PROCEDURE — 6370000000 HC RX 637 (ALT 250 FOR IP): Performed by: ADVANCED PRACTICE MIDWIFE

## 2023-10-26 PROCEDURE — 1100000000 HC RM PRIVATE

## 2023-10-26 PROCEDURE — 6370000000 HC RX 637 (ALT 250 FOR IP): Performed by: OBSTETRICS & GYNECOLOGY

## 2023-10-26 PROCEDURE — 3E0P7VZ INTRODUCTION OF HORMONE INTO FEMALE REPRODUCTIVE, VIA NATURAL OR ARTIFICIAL OPENING: ICD-10-PCS | Performed by: OBSTETRICS & GYNECOLOGY

## 2023-10-26 RX ORDER — EPHEDRINE SULFATE 50 MG/ML
INJECTION INTRAVENOUS
Status: DISCONTINUED
Start: 2023-10-26 | End: 2023-10-27 | Stop reason: WASHOUT

## 2023-10-26 RX ORDER — NALOXONE HYDROCHLORIDE 0.4 MG/ML
INJECTION, SOLUTION INTRAMUSCULAR; INTRAVENOUS; SUBCUTANEOUS PRN
Status: DISCONTINUED | OUTPATIENT
Start: 2023-10-26 | End: 2023-10-27 | Stop reason: HOSPADM

## 2023-10-26 RX ORDER — BUPIVACAINE HYDROCHLORIDE 2.5 MG/ML
INJECTION, SOLUTION EPIDURAL; INFILTRATION; INTRACAUDAL
Status: COMPLETED
Start: 2023-10-26 | End: 2023-10-27

## 2023-10-26 RX ORDER — DIPHENHYDRAMINE HCL 25 MG
25 CAPSULE ORAL EVERY 6 HOURS PRN
Status: DISCONTINUED | OUTPATIENT
Start: 2023-10-26 | End: 2023-10-27 | Stop reason: HOSPADM

## 2023-10-26 RX ORDER — FENTANYL 0.2 MG/100ML-BUPIV 0.125%-NACL 0.9% EPIDURAL INJ 2/0.125 MCG/ML-%
1-15 SOLUTION INJECTION CONTINUOUS
Status: DISCONTINUED | OUTPATIENT
Start: 2023-10-27 | End: 2023-10-27

## 2023-10-26 RX ORDER — EPHEDRINE SULFATE 50 MG/ML
10 INJECTION INTRAVENOUS AS NEEDED
Status: DISCONTINUED | OUTPATIENT
Start: 2023-10-26 | End: 2023-10-27 | Stop reason: HOSPADM

## 2023-10-26 RX ORDER — DIPHENHYDRAMINE HYDROCHLORIDE 50 MG/ML
25 INJECTION INTRAMUSCULAR; INTRAVENOUS EVERY 6 HOURS PRN
Status: DISCONTINUED | OUTPATIENT
Start: 2023-10-26 | End: 2023-10-27 | Stop reason: HOSPADM

## 2023-10-26 RX ADMIN — ONDANSETRON HYDROCHLORIDE 4 MG: 2 INJECTION, SOLUTION INTRAMUSCULAR; INTRAVENOUS at 22:59

## 2023-10-26 RX ADMIN — Medication 50 MCG: at 13:57

## 2023-10-26 RX ADMIN — SODIUM CHLORIDE, POTASSIUM CHLORIDE, SODIUM LACTATE AND CALCIUM CHLORIDE: 600; 310; 30; 20 INJECTION, SOLUTION INTRAVENOUS at 19:59

## 2023-10-26 RX ADMIN — Medication 50 MCG: at 09:14

## 2023-10-26 RX ADMIN — ONDANSETRON HYDROCHLORIDE 4 MG: 2 INJECTION, SOLUTION INTRAMUSCULAR; INTRAVENOUS at 15:09

## 2023-10-26 RX ADMIN — ONDANSETRON HYDROCHLORIDE 4 MG: 2 INJECTION, SOLUTION INTRAMUSCULAR; INTRAVENOUS at 04:51

## 2023-10-26 RX ADMIN — OXYTOCIN 1 MILLI-UNITS/MIN: 10 INJECTION INTRAVENOUS at 19:59

## 2023-10-26 RX ADMIN — ZOLPIDEM TARTRATE 5 MG: 5 TABLET ORAL at 20:18

## 2023-10-26 NOTE — PROGRESS NOTES
1530:  Bedside and Verbal shift change report given to Juli Rivas RN (oncoming nurse) by Dilshad Atkinson RN (offgoing nurse). Report included the following information Nurse Handoff Report, MAR, and Recent Results. 1616:  Dr Riya Beatty at bedside. Foleyballoon deflated and removed. SVE 4/50/-2 1935:  Bedside and Verbal shift change report given to [facility] [address] (oncoming nurse) by Juli Rivas RN (offgoing nurse). Report included the following information Nurse Handoff Report, Intake/Output, MAR, and Recent Results.

## 2023-10-26 NOTE — PROGRESS NOTES
1950: Bedside and Verbal shift change report given to Migue North RN (oncoming nurse) by Danial Jama. Law RN (offgoing nurse). Report included the following information Nurse Handoff Report, Adult Overview, Intake/Output, MAR, Recent Results, and Event Log.      2051: Pt on EFM. 2120: Cytotec 50mcg given (see MAR). 2nd dose. 2252: AASHISH Rapp at the bedside discussing plan of care. Cook cath placed, 60/60. Pt off EFM per MD.     0628: Pt refused cytotec. Educated pt on benefits and reasons we use cytotec. Pt agrees to reassess. 5446-3248: Pt up to bathroom, reports vomiting and nausea. 0451: Pt still feeling nauseas. Zofran given (see MAR). Bedside and Verbal shift change report given to [address] [place] [address] (oncoming nurse) by Migue North RN (offgoing nurse). Report included the following information Nurse Handoff Report, Adult Overview, Intake/Output, MAR, Recent Results, and Event Log. [FreeTextEntry3] : Improving left lower extremity cellulitis, resolved [FreeTextEntry1] : 59 yr old [place] afib, lagb, sleeve , radio venous ablation left gsv, hh repair\par complexitymod- reviewed previous us/ labs, films- no xr only us\par us shows venous in right, left is still closed\par ann pad going back to see  dr Lehman\par risk low- t on ac \par time30\par  \par 7/15/21\par Chronic Lymphedema \par Venous Insuffciency with skin changes\par No open wounds on legs, hemosiderin staining present\par s/p resolving cellulitis left leg from 4/2020\par Works as RN - long shifts standing constantly, wearing 20-30 compression socks, feels uncomfortable at top of socks/bothersome, uncomfortable\par uses lymphedema pump 2x/day on days off\par s/p RFA left leg\par

## 2023-10-26 NOTE — PROGRESS NOTES
Bedside and Verbal shift change report given to MICHELLE Freire RN (oncoming nurse) by Xin Ojeda RN (offgoing nurse). Report included the following information Nurse Handoff Report. 1959- Pitocin started. 2132- 500mL fluid bolus. 2245- SROM clear fluid. 2250- Pt up to void. Starting to feel nauseous. 2259- zofran given. 2324- Consents for nitrous reviewed and signed. 2340- Pt asking about epidural, requesting SVE before epidural, SVE 4/50/-2. 500mL fluid bolus started. 2358- Anesthesia at bedside for epidural.    0140- Straight cath 200mL    0504- Straight cath 100mL    0520- Pt complaining of back pain spinning baby. 6642- Anesthesia at bedside for redose. 0730- Bedside and Verbal shift change report given to DEMETRI Khalil RN (oncoming nurse) by MICHELLE Freire RN (offgoing nurse). Report included the following information Nurse Handoff Report.

## 2023-10-26 NOTE — PROGRESS NOTES
0750 Bedside and Verbal shift change report given to Tiffanie Ness RN (oncoming nurse) by Artice Krabbe RN (offgoing nurse). Report included the following information Nurse Handoff Report, MAR, Recent Results, and Event Log.   0750 Dr Downs Officer in talking with patient regarding plan of care  0801 Monitor applied prior to cytotec  0914 Cytotec given  1000 Sleeping  1100 Still sleeping off and on  1140 Monitor off  1205 IV flushed  1327 Monitor reapplied prior to next dose of cytotec  1358 Cytotec 50mgc buccal  1509 Complained of nausea. Zofran given  1530 Bedside and Verbal shift change report given to Davey Tom RN (oncoming nurse) by Tiffanie Ness RN (offgoing nurse). Report included the following information Nurse Handoff Report, MAR, Recent Results, and Event Log.

## 2023-10-27 PROBLEM — O14.93 PRE-ECLAMPSIA IN THIRD TRIMESTER: Status: ACTIVE | Noted: 2023-10-27

## 2023-10-27 LAB
CREAT UR-MCNC: 94.2 MG/DL
PROT UR-MCNC: 50 MG/DL (ref 0–11.9)
PROT/CREAT UR-RTO: 0.5

## 2023-10-27 PROCEDURE — 6360000002 HC RX W HCPCS: Performed by: ANESTHESIOLOGY

## 2023-10-27 PROCEDURE — 59514 CESAREAN DELIVERY ONLY: CPT | Performed by: OBSTETRICS & GYNECOLOGY

## 2023-10-27 PROCEDURE — 3609079900 HC CESAREAN SECTION: Performed by: OBSTETRICS & GYNECOLOGY

## 2023-10-27 PROCEDURE — 1120000000 HC RM PRIVATE OB

## 2023-10-27 PROCEDURE — 3700000025 EPIDURAL BLOCK: Performed by: ANESTHESIOLOGY

## 2023-10-27 PROCEDURE — 3700000000 HC ANESTHESIA ATTENDED CARE: Performed by: OBSTETRICS & GYNECOLOGY

## 2023-10-27 PROCEDURE — 2709999900 HC NON-CHARGEABLE SUPPLY: Performed by: OBSTETRICS & GYNECOLOGY

## 2023-10-27 PROCEDURE — 59510 CESAREAN DELIVERY: CPT | Performed by: OBSTETRICS & GYNECOLOGY

## 2023-10-27 PROCEDURE — 7210000100 HC LABOR FEE PER 1 HR

## 2023-10-27 PROCEDURE — 6360000002 HC RX W HCPCS: Performed by: OBSTETRICS & GYNECOLOGY

## 2023-10-27 PROCEDURE — 7100000001 HC PACU RECOVERY - ADDTL 15 MIN

## 2023-10-27 PROCEDURE — 2580000003 HC RX 258: Performed by: OBSTETRICS & GYNECOLOGY

## 2023-10-27 PROCEDURE — 6360000002 HC RX W HCPCS: Performed by: STUDENT IN AN ORGANIZED HEALTH CARE EDUCATION/TRAINING PROGRAM

## 2023-10-27 PROCEDURE — 2580000003 HC RX 258: Performed by: NURSE ANESTHETIST, CERTIFIED REGISTERED

## 2023-10-27 PROCEDURE — 3700000156 HC EPIDURAL ANESTHESIA

## 2023-10-27 PROCEDURE — 6370000000 HC RX 637 (ALT 250 FOR IP): Performed by: OBSTETRICS & GYNECOLOGY

## 2023-10-27 PROCEDURE — 51701 INSERT BLADDER CATHETER: CPT

## 2023-10-27 PROCEDURE — 7100000000 HC PACU RECOVERY - FIRST 15 MIN: Performed by: OBSTETRICS & GYNECOLOGY

## 2023-10-27 PROCEDURE — 7100000000 HC PACU RECOVERY - FIRST 15 MIN

## 2023-10-27 PROCEDURE — 84156 ASSAY OF PROTEIN URINE: CPT

## 2023-10-27 PROCEDURE — 2500000003 HC RX 250 WO HCPCS: Performed by: OBSTETRICS & GYNECOLOGY

## 2023-10-27 PROCEDURE — 82570 ASSAY OF URINE CREATININE: CPT

## 2023-10-27 PROCEDURE — 6360000002 HC RX W HCPCS: Performed by: NURSE ANESTHETIST, CERTIFIED REGISTERED

## 2023-10-27 PROCEDURE — 0UQ90ZZ REPAIR UTERUS, OPEN APPROACH: ICD-10-PCS | Performed by: OBSTETRICS & GYNECOLOGY

## 2023-10-27 PROCEDURE — 2500000003 HC RX 250 WO HCPCS: Performed by: STUDENT IN AN ORGANIZED HEALTH CARE EDUCATION/TRAINING PROGRAM

## 2023-10-27 PROCEDURE — 7100000001 HC PACU RECOVERY - ADDTL 15 MIN: Performed by: OBSTETRICS & GYNECOLOGY

## 2023-10-27 PROCEDURE — 3700000001 HC ADD 15 MINUTES (ANESTHESIA): Performed by: OBSTETRICS & GYNECOLOGY

## 2023-10-27 RX ORDER — ACETAMINOPHEN 500 MG
1000 TABLET ORAL EVERY 8 HOURS SCHEDULED
Status: DISCONTINUED | OUTPATIENT
Start: 2023-10-27 | End: 2023-10-30 | Stop reason: HOSPADM

## 2023-10-27 RX ORDER — OXYCODONE HYDROCHLORIDE 5 MG/1
5 TABLET ORAL EVERY 4 HOURS PRN
Status: DISCONTINUED | OUTPATIENT
Start: 2023-10-27 | End: 2023-10-30 | Stop reason: HOSPADM

## 2023-10-27 RX ORDER — LIDOCAINE HCL/EPINEPHRINE/PF 2%-1:200K
VIAL (ML) INJECTION
Status: DISCONTINUED
Start: 2023-10-27 | End: 2023-10-27

## 2023-10-27 RX ORDER — BUPIVACAINE HYDROCHLORIDE 2.5 MG/ML
INJECTION, SOLUTION EPIDURAL; INFILTRATION; INTRACAUDAL
Status: COMPLETED
Start: 2023-10-27 | End: 2023-10-27

## 2023-10-27 RX ORDER — DIPHENHYDRAMINE HYDROCHLORIDE 50 MG/ML
25 INJECTION INTRAMUSCULAR; INTRAVENOUS EVERY 6 HOURS PRN
Status: DISCONTINUED | OUTPATIENT
Start: 2023-10-27 | End: 2023-10-30 | Stop reason: HOSPADM

## 2023-10-27 RX ORDER — SODIUM CHLORIDE, SODIUM LACTATE, POTASSIUM CHLORIDE, CALCIUM CHLORIDE 600; 310; 30; 20 MG/100ML; MG/100ML; MG/100ML; MG/100ML
INJECTION, SOLUTION INTRAVENOUS CONTINUOUS
Status: DISCONTINUED | OUTPATIENT
Start: 2023-10-27 | End: 2023-10-30 | Stop reason: HOSPADM

## 2023-10-27 RX ORDER — POLYETHYLENE GLYCOL 3350 17 G/17G
17 POWDER, FOR SOLUTION ORAL DAILY PRN
Status: DISCONTINUED | OUTPATIENT
Start: 2023-10-27 | End: 2023-10-30 | Stop reason: HOSPADM

## 2023-10-27 RX ORDER — SODIUM CHLORIDE, SODIUM LACTATE, POTASSIUM CHLORIDE, CALCIUM CHLORIDE 600; 310; 30; 20 MG/100ML; MG/100ML; MG/100ML; MG/100ML
INJECTION, SOLUTION INTRAVENOUS CONTINUOUS PRN
Status: DISCONTINUED | OUTPATIENT
Start: 2023-10-27 | End: 2023-10-27 | Stop reason: SDUPTHER

## 2023-10-27 RX ORDER — SODIUM CHLORIDE 9 MG/ML
INJECTION, SOLUTION INTRAVENOUS PRN
Status: DISCONTINUED | OUTPATIENT
Start: 2023-10-27 | End: 2023-10-30 | Stop reason: HOSPADM

## 2023-10-27 RX ORDER — FENTANYL CITRATE 50 UG/ML
INJECTION, SOLUTION INTRAMUSCULAR; INTRAVENOUS
Status: COMPLETED
Start: 2023-10-27 | End: 2023-10-27

## 2023-10-27 RX ORDER — BUPIVACAINE HYDROCHLORIDE 5 MG/ML
INJECTION, SOLUTION EPIDURAL; INTRACAUDAL
Status: DISCONTINUED
Start: 2023-10-27 | End: 2023-10-27

## 2023-10-27 RX ORDER — MODIFIED LANOLIN
OINTMENT (GRAM) TOPICAL
Status: DISCONTINUED | OUTPATIENT
Start: 2023-10-27 | End: 2023-10-30 | Stop reason: HOSPADM

## 2023-10-27 RX ORDER — SODIUM CHLORIDE 0.9 % (FLUSH) 0.9 %
5-40 SYRINGE (ML) INJECTION EVERY 12 HOURS SCHEDULED
Status: DISCONTINUED | OUTPATIENT
Start: 2023-10-27 | End: 2023-10-30 | Stop reason: HOSPADM

## 2023-10-27 RX ORDER — CITRIC ACID/SODIUM CITRATE 334-500MG
SOLUTION, ORAL ORAL
Status: DISCONTINUED
Start: 2023-10-27 | End: 2023-10-27

## 2023-10-27 RX ORDER — ACETAMINOPHEN 500 MG
1000 TABLET ORAL ONCE
Status: COMPLETED | OUTPATIENT
Start: 2023-10-27 | End: 2023-10-27

## 2023-10-27 RX ORDER — BUPIVACAINE HYDROCHLORIDE 2.5 MG/ML
INJECTION, SOLUTION EPIDURAL; INFILTRATION; INTRACAUDAL PRN
Status: DISCONTINUED | OUTPATIENT
Start: 2023-10-27 | End: 2023-10-27 | Stop reason: SDUPTHER

## 2023-10-27 RX ORDER — SODIUM CHLORIDE 0.9 % (FLUSH) 0.9 %
5-40 SYRINGE (ML) INJECTION PRN
Status: DISCONTINUED | OUTPATIENT
Start: 2023-10-27 | End: 2023-10-30 | Stop reason: HOSPADM

## 2023-10-27 RX ORDER — KETOROLAC TROMETHAMINE 30 MG/ML
INJECTION, SOLUTION INTRAMUSCULAR; INTRAVENOUS PRN
Status: DISCONTINUED | OUTPATIENT
Start: 2023-10-27 | End: 2023-10-27 | Stop reason: SDUPTHER

## 2023-10-27 RX ORDER — DEXAMETHASONE SODIUM PHOSPHATE 4 MG/ML
INJECTION, SOLUTION INTRA-ARTICULAR; INTRALESIONAL; INTRAMUSCULAR; INTRAVENOUS; SOFT TISSUE PRN
Status: DISCONTINUED | OUTPATIENT
Start: 2023-10-27 | End: 2023-10-27 | Stop reason: SDUPTHER

## 2023-10-27 RX ORDER — FENTANYL CITRATE 50 UG/ML
INJECTION, SOLUTION INTRAMUSCULAR; INTRAVENOUS PRN
Status: DISCONTINUED | OUTPATIENT
Start: 2023-10-27 | End: 2023-10-27 | Stop reason: SDUPTHER

## 2023-10-27 RX ORDER — MORPHINE SULFATE 1 MG/ML
INJECTION, SOLUTION EPIDURAL; INTRATHECAL; INTRAVENOUS PRN
Status: DISCONTINUED | OUTPATIENT
Start: 2023-10-27 | End: 2023-10-27 | Stop reason: SDUPTHER

## 2023-10-27 RX ORDER — DIPHENHYDRAMINE HYDROCHLORIDE 50 MG/ML
50 INJECTION INTRAMUSCULAR; INTRAVENOUS ONCE
Status: DISCONTINUED | OUTPATIENT
Start: 2023-10-27 | End: 2023-10-27

## 2023-10-27 RX ORDER — ONDANSETRON 2 MG/ML
INJECTION INTRAMUSCULAR; INTRAVENOUS PRN
Status: DISCONTINUED | OUTPATIENT
Start: 2023-10-27 | End: 2023-10-27 | Stop reason: SDUPTHER

## 2023-10-27 RX ORDER — IBUPROFEN 600 MG/1
600 TABLET ORAL EVERY 8 HOURS SCHEDULED
Status: DISCONTINUED | OUTPATIENT
Start: 2023-10-27 | End: 2023-10-30 | Stop reason: HOSPADM

## 2023-10-27 RX ORDER — KETOROLAC TROMETHAMINE 30 MG/ML
30 INJECTION, SOLUTION INTRAMUSCULAR; INTRAVENOUS EVERY 6 HOURS
Status: DISPENSED | OUTPATIENT
Start: 2023-10-27 | End: 2023-10-28

## 2023-10-27 RX ORDER — DOCUSATE SODIUM 100 MG/1
100 CAPSULE, LIQUID FILLED ORAL 2 TIMES DAILY
Status: DISCONTINUED | OUTPATIENT
Start: 2023-10-27 | End: 2023-10-30 | Stop reason: HOSPADM

## 2023-10-27 RX ORDER — ONDANSETRON 2 MG/ML
4 INJECTION INTRAMUSCULAR; INTRAVENOUS EVERY 6 HOURS PRN
Status: DISCONTINUED | OUTPATIENT
Start: 2023-10-27 | End: 2023-10-30 | Stop reason: HOSPADM

## 2023-10-27 RX ORDER — CITRIC ACID/SODIUM CITRATE 334-500MG
30 SOLUTION, ORAL ORAL ONCE
Status: COMPLETED | OUTPATIENT
Start: 2023-10-27 | End: 2023-10-27

## 2023-10-27 RX ADMIN — FENTANYL CITRATE 100 MCG: 50 INJECTION, SOLUTION INTRAMUSCULAR; INTRAVENOUS at 11:03

## 2023-10-27 RX ADMIN — KETOROLAC TROMETHAMINE 30 MG: 30 INJECTION, SOLUTION INTRAMUSCULAR at 16:47

## 2023-10-27 RX ADMIN — FENTANYL CITRATE 50 MCG: 50 INJECTION INTRAMUSCULAR; INTRAVENOUS at 16:47

## 2023-10-27 RX ADMIN — KETOROLAC TROMETHAMINE 30 MG: 30 INJECTION, SOLUTION INTRAMUSCULAR; INTRAVENOUS at 22:28

## 2023-10-27 RX ADMIN — MORPHINE SULFATE 1 MG: 1 INJECTION EPIDURAL; INTRATHECAL; INTRAVENOUS at 16:20

## 2023-10-27 RX ADMIN — BUPIVACAINE HYDROCHLORIDE 10 ML: 2.5 INJECTION, SOLUTION EPIDURAL; INFILTRATION; INTRACAUDAL; PERINEURAL at 05:50

## 2023-10-27 RX ADMIN — SODIUM CHLORIDE, PRESERVATIVE FREE 20 MG: 5 INJECTION INTRAVENOUS at 15:07

## 2023-10-27 RX ADMIN — BUPIVACAINE HYDROCHLORIDE 3 ML: 2.5 INJECTION, SOLUTION EPIDURAL; INFILTRATION; INTRACAUDAL at 11:10

## 2023-10-27 RX ADMIN — FENTANYL CITRATE 25 MCG: 50 INJECTION INTRAMUSCULAR; INTRAVENOUS at 16:38

## 2023-10-27 RX ADMIN — BUPIVACAINE HYDROCHLORIDE 3 ML: 2.5 INJECTION, SOLUTION EPIDURAL; INFILTRATION; INTRACAUDAL at 11:07

## 2023-10-27 RX ADMIN — ONDANSETRON HYDROCHLORIDE 4 MG: 2 INJECTION, SOLUTION INTRAMUSCULAR; INTRAVENOUS at 08:05

## 2023-10-27 RX ADMIN — FENTANYL CITRATE 25 MCG: 50 INJECTION INTRAMUSCULAR; INTRAVENOUS at 16:42

## 2023-10-27 RX ADMIN — Medication 12 ML/HR: at 07:48

## 2023-10-27 RX ADMIN — ONDANSETRON 4 MG: 2 INJECTION INTRAMUSCULAR; INTRAVENOUS at 15:26

## 2023-10-27 RX ADMIN — Medication 12 ML/HR: at 06:02

## 2023-10-27 RX ADMIN — LIDOCAINE HYDROCHLORIDE 5 ML: 10; .005 INJECTION, SOLUTION EPIDURAL; INFILTRATION; INTRACAUDAL; PERINEURAL at 11:01

## 2023-10-27 RX ADMIN — LIDOCAINE HYDROCHLORIDE 5 ML: 10; .005 INJECTION, SOLUTION EPIDURAL; INFILTRATION; INTRACAUDAL; PERINEURAL at 00:05

## 2023-10-27 RX ADMIN — SODIUM CITRATE AND CITRIC ACID MONOHYDRATE 30 ML: 500; 334 SOLUTION ORAL at 15:21

## 2023-10-27 RX ADMIN — MORPHINE SULFATE 1 MG: 1 INJECTION EPIDURAL; INTRATHECAL; INTRAVENOUS at 16:23

## 2023-10-27 RX ADMIN — Medication 10 ML/HR: at 00:33

## 2023-10-27 RX ADMIN — LIDOCAINE HYDROCHLORIDE 2 ML: 10; .005 INJECTION, SOLUTION EPIDURAL; INFILTRATION; INTRACAUDAL; PERINEURAL at 05:50

## 2023-10-27 RX ADMIN — BUPIVACAINE HYDROCHLORIDE 16 ML: 2.5 INJECTION, SOLUTION EPIDURAL; INFILTRATION; INTRACAUDAL; PERINEURAL at 00:05

## 2023-10-27 RX ADMIN — SODIUM CHLORIDE, POTASSIUM CHLORIDE, SODIUM LACTATE AND CALCIUM CHLORIDE: 600; 310; 30; 20 INJECTION, SOLUTION INTRAVENOUS at 15:23

## 2023-10-27 RX ADMIN — WATER 2000 MG: 1 INJECTION INTRAMUSCULAR; INTRAVENOUS; SUBCUTANEOUS at 15:11

## 2023-10-27 RX ADMIN — MORPHINE SULFATE 1 MG: 1 INJECTION EPIDURAL; INTRATHECAL; INTRAVENOUS at 16:26

## 2023-10-27 RX ADMIN — ACETAMINOPHEN 1000 MG: 500 TABLET ORAL at 15:10

## 2023-10-27 RX ADMIN — Medication 909 ML/HR: at 15:54

## 2023-10-27 RX ADMIN — AZITHROMYCIN MONOHYDRATE 500 MG: 500 INJECTION, POWDER, LYOPHILIZED, FOR SOLUTION INTRAVENOUS at 15:27

## 2023-10-27 RX ADMIN — DEXAMETHASONE SODIUM PHOSPHATE 4 MG: 4 INJECTION INTRA-ARTICULAR; INTRALESIONAL; INTRAMUSCULAR; INTRAVENOUS; SOFT TISSUE at 15:29

## 2023-10-27 RX ADMIN — SODIUM CHLORIDE, POTASSIUM CHLORIDE, SODIUM LACTATE AND CALCIUM CHLORIDE 500 ML: 600; 310; 30; 20 INJECTION, SOLUTION INTRAVENOUS at 08:11

## 2023-10-27 RX ADMIN — BUPIVACAINE HYDROCHLORIDE 10 ML: 2.5 INJECTION, SOLUTION EPIDURAL; INFILTRATION; INTRACAUDAL; PERINEURAL at 04:16

## 2023-10-27 ASSESSMENT — PAIN SCALES - GENERAL: PAINLEVEL_OUTOF10: 1

## 2023-10-27 NOTE — OP NOTE
Operative Note      Patient: Kelli Barreto  YOB: 1990  MRN: [id number]    Date of Procedure: 10/27/2023    * No Diagnosis Codes entered *    Pre-Op diagnosis  41w0d term pregnancy  OP position  Pre-eclampsia without severe features  History of epilepsy  Active phase arrest    Post-Op Diagnosis: Same as above, delivered       Procedure(s):   SECTION    Surgeon(s):  MD Marlene Bravo MD Dr. Rumalda Like, the assistant surgeon, was present during the procedure. The physician's presence was necessary due to significant bleeding. The assistant surgeon performed significant portions of the surgery, including incising tissue, clamping, control of bleeding with suturing and cauterization, and decision making at challenging portions of the procedure. This assistance was necessary to accomplish the optimal outcome for the patient, above and beyond what a non-MD assistant could have provided. Anesthesia: Epidural    Estimated Blood Loss (mL): QBL pending    Complications: None    Specimens:   * No specimens in log *    Implants:  * No implants in log *      Drains:   Urinary Catheter 10/27/23 Manriquez (Active)   Output (mL) 10 mL 10/27/23 1514       Findings: Exam revealed normal appearing uterus with 2 cm subserosal fibroid in the anterior midline. Liveborn female infant. Normal appearing placenta with 3 vessel cord and central insertion. Detailed Description of Procedure:   After obtaining and verifying informed consent, the patient was taken to the operating room. A time out was completed to verify correct patient, procedure, and site. A spinal was placed and found to be adequate. The patient was then prepared and draped in the usual sterile fashion in a dorsal supine position with a leftward tilt. A pfannenstiel skin incision was made with a scalpel and carried through to the underlying layer of fascia.  The fascia was incised in the midline and the incision extended laterally with

## 2023-10-27 NOTE — ANESTHESIA PROCEDURE NOTES
Epidural Block    Patient location during procedure: OB  Start time: 10/26/2023 11:55 PM  End time: 10/27/2023 12:05 AM  Reason for block: labor epidural  Staffing  Performed: anesthesiologist   Anesthesiologist: Bridget Wolff DO  Performed by: Bridget Wolff DO  Authorized by: Bridget Wolff DO    Epidural  Patient position: sitting  Prep: DuraPrep  Patient monitoring: cardiac monitor, continuous pulse ox and frequent blood pressure checks  Approach: midline  Location: L3-4  Injection technique: GALA air  Provider prep: mask and sterile gloves  Needle  Needle type: Tuohy   Needle gauge: 17 G  Needle length: 3.5 in  Needle insertion depth: 6.5 cm  Catheter type: multi-orifice  Catheter size: 19 G  Catheter at skin depth: 10.5 cm  Test dose: negativeCatheter Secured: tegaderm and tape  Assessment  Sensory level: T6  Hemodynamics: stable  Attempts: 1  Outcomes: uncomplicated and patient tolerated procedure well  Preanesthetic Checklist  Completed: patient identified, IV checked, site marked, risks and benefits discussed, surgical/procedural consents, equipment checked, pre-op evaluation, timeout performed, anesthesia consent given, oxygen available, monitors applied/VS acknowledged and fire risk safety assessment completed and verbalized

## 2023-10-27 NOTE — ANESTHESIA PROCEDURE NOTES
Epidural Block    Patient location during procedure: OB  Start time: 10/27/2023 10:51 AM  End time: 10/27/2023 11:11 AM  Reason for block: labor epidural  Staffing  Performed: anesthesiologist   Anesthesiologist: Daren Yost MD  Performed by: Daren Yost MD  Authorized by: Daren Yost MD    Epidural  Patient position: sitting  Prep: DuraPrep  Patient monitoring: cardiac monitor, continuous pulse ox and frequent blood pressure checks  Approach: midline  Location: L2-3  Injection technique: GALA saline  Provider prep: mask and sterile gloves  Needle  Needle type: Tuohy   Needle gauge: 17 G  Needle length: 3.5 in  Needle insertion depth: 7 cm  Catheter type: end hole  Catheter size: 25 G  Catheter at skin depth: 12 cmCatheter Secured: tegaderm and tape  Assessment  Sensory level: T6  Events: None  Hemodynamics: stable  Attempts: 1  Outcomes: uncomplicated and patient tolerated procedure well  Additional Notes  Epidural replaced  Preanesthetic Checklist  Completed: patient identified, IV checked, site marked, risks and benefits discussed, surgical/procedural consents, equipment checked, pre-op evaluation, timeout performed, anesthesia consent given, oxygen available, monitors applied/VS acknowledged and fire risk safety assessment completed and verbalized

## 2023-10-27 NOTE — PROGRESS NOTES
@3967 Bedside shift change report given to DEMETRI Rodriguez RN (oncoming nurse) by MICHELLE Elliott RN (offgoing nurse). Report included the following information Nurse Handoff Report, ED SBAR, Adult Overview, Intake/Output, and MAR.      @3303 Patient sitting up in throne position    @5003 Manriquez placed, scant amount of urine seen. Will start fluid bolus. @0338 turned on right side, peanut ball in place    @0905 Spinning babies on right side    @0912 Patient turned to left side, NOVI patch fell off and reapplied    @0920 Spinning babies on left side    @0927 Patient placed into runners lunge with peanut ball on left side, in trendelenberg    @0950 Patient complaining of left sided back pain again, given bolus button. Will ask anesthesia for redose if two presses of bolus buttons don't relieve pain.     @1020 Dr. Dean Holman at bedside for epidural redose. Q 3 minutes blood pressures x 15 minutes per anesthesia.    [id number] Patient turned to right side, pain relieved on left side but now with discomfort on right. Will reevaluate after patient on right side for 15 minutes. @5659 Dr. Peter Barbosa updated on patient's Bps, urine output, planning on epidural replacement. States she will be up to see patient soon. @8556 Time out for epidural replacement    @1125 Patient turned to lay on right side    @1150 Dr. Peter Barbosa at bedside to evaluate and discuss plan of care    @0187 Patient turned to left side    @1340 Patient turned to right side. RASHEEDA stocking applied. @6846 In OR 1 for primary c/s    @1652 Back in room 9 following c/s    @1900 Out of PACU to room 328    @1915 TRANSFER - OUT REPORT:    Verbal report given to MICHELLE Parkinson on [facility]  being transferred to [facility] for routine post-op       Report consisted of patient's Situation, Background, Assessment and   Recommendations(SBAR).      Information from the following report(s) Nurse Handoff Report, Adult Overview, Surgery Report, Intake/Output, MAR, and Recent Results was reviewed with

## 2023-10-27 NOTE — PROGRESS NOTES
In to check on Mian. Unchanged cervical exam /+1 since 0700 with significant caput and swelling. Given unchanged cervical exam, recommendation made for primary  delivery. We discussed the risks of surgery including the risks of bleeding, infection, deep vein thrombosis, and surgical injuries to internal organs including but not limited to the bowels, bladder, rectum, and female reproductive organs. The patient understands the risks; any and all questions were answered to the patient's satisfaction.     Jiles Lanes, MD

## 2023-10-28 LAB
ERYTHROCYTE [DISTWIDTH] IN BLOOD BY AUTOMATED COUNT: 12.7 % (ref 11.5–14.5)
HCT VFR BLD AUTO: 29.3 % (ref 35–47)
HGB BLD-MCNC: 9.8 G/DL (ref 11.5–16)
MCH RBC QN AUTO: 30.4 PG (ref 26–34)
MCHC RBC AUTO-ENTMCNC: 33.4 G/DL (ref 30–36.5)
MCV RBC AUTO: 91 FL (ref 80–99)
NRBC # BLD: 0 K/UL (ref 0–0.01)
NRBC BLD-RTO: 0 PER 100 WBC
PLATELET # BLD AUTO: 160 K/UL (ref 150–400)
PMV BLD AUTO: 10.6 FL (ref 8.9–12.9)
RBC # BLD AUTO: 3.22 M/UL (ref 3.8–5.2)
WBC # BLD AUTO: 16.8 K/UL (ref 3.6–11)

## 2023-10-28 PROCEDURE — 36415 COLL VENOUS BLD VENIPUNCTURE: CPT

## 2023-10-28 PROCEDURE — 1120000000 HC RM PRIVATE OB

## 2023-10-28 PROCEDURE — 6360000002 HC RX W HCPCS: Performed by: OBSTETRICS & GYNECOLOGY

## 2023-10-28 PROCEDURE — 6370000000 HC RX 637 (ALT 250 FOR IP): Performed by: OBSTETRICS & GYNECOLOGY

## 2023-10-28 PROCEDURE — 85027 COMPLETE CBC AUTOMATED: CPT

## 2023-10-28 PROCEDURE — 2580000003 HC RX 258: Performed by: OBSTETRICS & GYNECOLOGY

## 2023-10-28 RX ADMIN — ACETAMINOPHEN 1000 MG: 500 TABLET ORAL at 15:50

## 2023-10-28 RX ADMIN — SODIUM CHLORIDE, PRESERVATIVE FREE 10 ML: 5 INJECTION INTRAVENOUS at 03:56

## 2023-10-28 RX ADMIN — KETOROLAC TROMETHAMINE 30 MG: 30 INJECTION, SOLUTION INTRAMUSCULAR; INTRAVENOUS at 03:55

## 2023-10-28 RX ADMIN — IBUPROFEN 600 MG: 600 TABLET, FILM COATED ORAL at 15:50

## 2023-10-28 ASSESSMENT — PAIN DESCRIPTION - LOCATION: LOCATION: ABDOMEN

## 2023-10-28 ASSESSMENT — PAIN DESCRIPTION - ORIENTATION: ORIENTATION: LOWER

## 2023-10-28 ASSESSMENT — PAIN - FUNCTIONAL ASSESSMENT: PAIN_FUNCTIONAL_ASSESSMENT: ACTIVITIES ARE NOT PREVENTED

## 2023-10-28 ASSESSMENT — PAIN DESCRIPTION - DESCRIPTORS: DESCRIPTORS: ACHING;DISCOMFORT

## 2023-10-28 NOTE — LACTATION NOTE
This note was copied from a baby's chart. Initial Lactation Consultation  - Baby born by  yesterday to a  mom at 36 weeks gestation. Mom noticed breast changes during her pregnancy. Mom states she has been getting baby latched but she is slipping down the nipple. I helped mom with a feeding this afternoon. Baby was slipping down the nipple. We were able to get baby latched deeper with the nipple shield. We used some sweetease and baby was sucking rhythmically. Mom will continue to feed the baby according to her feeding cues. She will use the shield as needed.

## 2023-10-28 NOTE — DISCHARGE INSTRUCTIONS
your baby. Avoid heavy lifting, strenuous exercise, and excessive stair climbing. If you delivered by  section, take your time. Give yourself some cedrick! You should be up and moving multiple times per day, this will help you to feel better faster. However, be mindful and do not push yourself. If you have a day that you feel very uncomfortable, you likely did too much the day before. Rest and recognize your body is not ready for that level of activity yet. You will get there soon. A good rule to follow is that you should not lift anything heavier than your baby in the care seat for the first 2 weeks. Moms with toddlers at home have children climb up to you on the couch to snuggle. If you do not have help at home post  and have multiple children to care for, please reach out to us. Driving is individual. 7-14 days is a good rule of thumb. The first time you drive please have someone with you that can take over if you suddenly feel you cannot continue. Do not drive if you are taking narcotics for pain relief. Shower as often as you like. You can even take a bath. An Epsom salt bath may help you feel better after delivery. For  deliveries, keep the water level below your incision. There should be nothing placed in the vagina until after your postpartum checkup. This means no tampons, douching, or intercourse (sex). Make your follow-up appointment for about six weeks after delivery. Uterine changes/bleeding/perineal care  Your uterus will still be enlarged after delivery. Some women can feel their uterus as a firm melon sized lump below the belly button. You will feel contractions (afterbirth pains) after delivery as your uterus works to get back to a non-pregnant size. These pains tend to get more intense with each delivery. Breastfeeding mothers may experience more cramping during and after feeding, especially in the early weeks. These contractions are temporary.  Use relaxation/breathing

## 2023-10-29 LAB
ALBUMIN SERPL-MCNC: 2.2 G/DL (ref 3.5–5)
ALBUMIN/GLOB SERPL: 0.6 (ref 1.1–2.2)
ALP SERPL-CCNC: 96 U/L (ref 45–117)
ALT SERPL-CCNC: 14 U/L (ref 12–78)
ANION GAP SERPL CALC-SCNC: 6 MMOL/L (ref 5–15)
AST SERPL-CCNC: 16 U/L (ref 15–37)
BASOPHILS # BLD: 0 K/UL (ref 0–0.1)
BASOPHILS NFR BLD: 0 % (ref 0–1)
BILIRUB SERPL-MCNC: 0.2 MG/DL (ref 0.2–1)
BUN SERPL-MCNC: 9 MG/DL (ref 6–20)
BUN/CREAT SERPL: 14 (ref 12–20)
CALCIUM SERPL-MCNC: 9.7 MG/DL (ref 8.5–10.1)
CHLORIDE SERPL-SCNC: 108 MMOL/L (ref 97–108)
CO2 SERPL-SCNC: 26 MMOL/L (ref 21–32)
CREAT SERPL-MCNC: 0.65 MG/DL (ref 0.55–1.02)
CREAT UR-MCNC: 42.9 MG/DL
DIFFERENTIAL METHOD BLD: ABNORMAL
EOSINOPHIL # BLD: 0.1 K/UL (ref 0–0.4)
EOSINOPHIL NFR BLD: 1 % (ref 0–7)
ERYTHROCYTE [DISTWIDTH] IN BLOOD BY AUTOMATED COUNT: 12.6 % (ref 11.5–14.5)
GLOBULIN SER CALC-MCNC: 3.5 G/DL (ref 2–4)
GLUCOSE SERPL-MCNC: 107 MG/DL (ref 65–100)
HCT VFR BLD AUTO: 27.5 % (ref 35–47)
HGB BLD-MCNC: 9.4 G/DL (ref 11.5–16)
IMM GRANULOCYTES # BLD AUTO: 0.1 K/UL (ref 0–0.04)
IMM GRANULOCYTES NFR BLD AUTO: 1 % (ref 0–0.5)
LYMPHOCYTES # BLD: 2.3 K/UL (ref 0.8–3.5)
LYMPHOCYTES NFR BLD: 22 % (ref 12–49)
MCH RBC QN AUTO: 30.6 PG (ref 26–34)
MCHC RBC AUTO-ENTMCNC: 34.2 G/DL (ref 30–36.5)
MCV RBC AUTO: 89.6 FL (ref 80–99)
MONOCYTES # BLD: 0.6 K/UL (ref 0–1)
MONOCYTES NFR BLD: 6 % (ref 5–13)
NEUTS SEG # BLD: 7.5 K/UL (ref 1.8–8)
NEUTS SEG NFR BLD: 70 % (ref 32–75)
NRBC # BLD: 0 K/UL (ref 0–0.01)
NRBC BLD-RTO: 0 PER 100 WBC
PLATELET # BLD AUTO: 176 K/UL (ref 150–400)
PMV BLD AUTO: 10.2 FL (ref 8.9–12.9)
POTASSIUM SERPL-SCNC: 3.9 MMOL/L (ref 3.5–5.1)
PROT SERPL-MCNC: 5.7 G/DL (ref 6.4–8.2)
PROT UR-MCNC: 8 MG/DL (ref 0–11.9)
PROT/CREAT UR-RTO: 0.2
RBC # BLD AUTO: 3.07 M/UL (ref 3.8–5.2)
SODIUM SERPL-SCNC: 140 MMOL/L (ref 136–145)
WBC # BLD AUTO: 10.6 K/UL (ref 3.6–11)

## 2023-10-29 PROCEDURE — 84156 ASSAY OF PROTEIN URINE: CPT

## 2023-10-29 PROCEDURE — 80053 COMPREHEN METABOLIC PANEL: CPT

## 2023-10-29 PROCEDURE — 82570 ASSAY OF URINE CREATININE: CPT

## 2023-10-29 PROCEDURE — 85025 COMPLETE CBC W/AUTO DIFF WBC: CPT

## 2023-10-29 PROCEDURE — 1120000000 HC RM PRIVATE OB

## 2023-10-29 PROCEDURE — 36415 COLL VENOUS BLD VENIPUNCTURE: CPT

## 2023-10-29 PROCEDURE — 6370000000 HC RX 637 (ALT 250 FOR IP): Performed by: OBSTETRICS & GYNECOLOGY

## 2023-10-29 RX ORDER — LEVETIRACETAM 750 MG/1
1500 TABLET, EXTENDED RELEASE ORAL DAILY
Status: DISCONTINUED | OUTPATIENT
Start: 2023-10-29 | End: 2023-10-30 | Stop reason: HOSPADM

## 2023-10-29 RX ORDER — LIDOCAINE HYDROCHLORIDE 20 MG/ML
JELLY TOPICAL PRN
Status: DISCONTINUED | OUTPATIENT
Start: 2023-10-29 | End: 2023-10-30 | Stop reason: HOSPADM

## 2023-10-29 RX ADMIN — ACETAMINOPHEN 1000 MG: 500 TABLET ORAL at 08:35

## 2023-10-29 RX ADMIN — ACETAMINOPHEN 1000 MG: 500 TABLET ORAL at 17:09

## 2023-10-29 RX ADMIN — IBUPROFEN 600 MG: 600 TABLET, FILM COATED ORAL at 17:08

## 2023-10-29 RX ADMIN — DOCUSATE SODIUM 100 MG: 100 CAPSULE, LIQUID FILLED ORAL at 08:35

## 2023-10-29 RX ADMIN — IBUPROFEN 600 MG: 600 TABLET, FILM COATED ORAL at 00:04

## 2023-10-29 RX ADMIN — DOCUSATE SODIUM 100 MG: 100 CAPSULE, LIQUID FILLED ORAL at 21:39

## 2023-10-29 RX ADMIN — ACETAMINOPHEN 1000 MG: 500 TABLET ORAL at 00:04

## 2023-10-29 RX ADMIN — IBUPROFEN 600 MG: 600 TABLET, FILM COATED ORAL at 08:35

## 2023-10-29 ASSESSMENT — PAIN DESCRIPTION - ORIENTATION
ORIENTATION: INNER
ORIENTATION: LOWER

## 2023-10-29 ASSESSMENT — PAIN SCALES - GENERAL
PAINLEVEL_OUTOF10: 4
PAINLEVEL_OUTOF10: 4

## 2023-10-29 ASSESSMENT — PAIN DESCRIPTION - DESCRIPTORS
DESCRIPTORS: CRAMPING
DESCRIPTORS: ACHING

## 2023-10-29 ASSESSMENT — PAIN DESCRIPTION - LOCATION
LOCATION: ABDOMEN;PERINEUM
LOCATION: ABDOMEN

## 2023-10-29 NOTE — PROGRESS NOTES
0745 Verbal shift change report received from [address], RN. Report included the following information Nurse Handoff Report, Intake/Output, MAR, and Recent Results. 5827 Complains of lower abdominal cramping 4/10, medicated with Tylenol 1000 mg and Ibuprofen 600 mg. Denies headache, visual disturbance or epigastric pain. Bilateral lower extremities +2 pitting edema noted. Abdominal dressing clean dry and intact. Abdomen soft, non distended. Patient passing flatus  1000 Discomfort improving. Patient asked to bring Keppra XR from home to be verified by pharmacy. 1430 [facility] called, requested order be placed for patient to take own Keppra XR  1200 Patient's Keppra taken to pharmacy to be verified, patient ambulating in [place], tolerating well   1330 Keppra returned from pharmacy after being verified. RN into patient's room to administer, patient states she took at 0930, documentation entered on [facility], medication left at bedside. Breastfeeding infant, doing well  1400 Verbal shift change report given to Jj Gomez RN  by [facility]. Report included the following information Nurse Handoff Report, Intake/Output, MAR, and Recent Results.

## 2023-10-29 NOTE — PROGRESS NOTES
1945: Bedside and Verbal shift change report given to [address] RN (oncoming nurse) by Vladislav Enciso RN (offgoing nurse). Report included the following information Nurse Handoff Report, Adult Overview, Surgery Report, Intake/Output, MAR, and Recent Results.

## 2023-10-29 NOTE — PROGRESS NOTES
Notified [address] of pt's bp. [facility] Will continue to monitor.

## 2023-10-30 VITALS
TEMPERATURE: 98.9 F | OXYGEN SATURATION: 96 % | RESPIRATION RATE: 16 BRPM | HEART RATE: 83 BPM | DIASTOLIC BLOOD PRESSURE: 93 MMHG | SYSTOLIC BLOOD PRESSURE: 148 MMHG

## 2023-10-30 PROCEDURE — 6370000000 HC RX 637 (ALT 250 FOR IP): Performed by: OBSTETRICS & GYNECOLOGY

## 2023-10-30 RX ORDER — OXYCODONE HYDROCHLORIDE 5 MG/1
5 TABLET ORAL EVERY 4 HOURS PRN
Qty: 12 TABLET | Refills: 0 | Status: SHIPPED | OUTPATIENT
Start: 2023-10-30 | End: 2023-11-02

## 2023-10-30 RX ORDER — FUROSEMIDE 20 MG/1
20 TABLET ORAL ONCE
Status: COMPLETED | OUTPATIENT
Start: 2023-10-30 | End: 2023-10-30

## 2023-10-30 RX ORDER — PSEUDOEPHEDRINE HCL 30 MG
100 TABLET ORAL 2 TIMES DAILY
Qty: 90 CAPSULE | Refills: 1 | Status: SHIPPED | OUTPATIENT
Start: 2023-10-30

## 2023-10-30 RX ORDER — ACETAMINOPHEN 500 MG
1000 TABLET ORAL 3 TIMES DAILY
Qty: 90 TABLET | Refills: 1 | Status: SHIPPED | OUTPATIENT
Start: 2023-10-30

## 2023-10-30 RX ORDER — IBUPROFEN 800 MG/1
800 TABLET ORAL 3 TIMES DAILY
Qty: 90 TABLET | Refills: 1 | Status: SHIPPED | OUTPATIENT
Start: 2023-10-30

## 2023-10-30 RX ADMIN — DOCUSATE SODIUM 100 MG: 100 CAPSULE, LIQUID FILLED ORAL at 09:44

## 2023-10-30 RX ADMIN — Medication: at 10:27

## 2023-10-30 RX ADMIN — ACETAMINOPHEN 1000 MG: 500 TABLET ORAL at 00:56

## 2023-10-30 RX ADMIN — ACETAMINOPHEN 1000 MG: 500 TABLET ORAL at 09:44

## 2023-10-30 RX ADMIN — IBUPROFEN 600 MG: 600 TABLET, FILM COATED ORAL at 00:56

## 2023-10-30 RX ADMIN — LEVETIRACETAM 1500 MG: 750 TABLET, EXTENDED RELEASE ORAL at 09:48

## 2023-10-30 RX ADMIN — FUROSEMIDE 20 MG: 20 TABLET ORAL at 09:44

## 2023-10-30 RX ADMIN — IBUPROFEN 600 MG: 600 TABLET, FILM COATED ORAL at 09:44

## 2023-10-30 ASSESSMENT — PAIN DESCRIPTION - LOCATION: LOCATION: BACK;INCISION

## 2023-10-30 ASSESSMENT — PAIN DESCRIPTION - ORIENTATION: ORIENTATION: LOWER;MID

## 2023-10-30 ASSESSMENT — PAIN SCALES - GENERAL: PAINLEVEL_OUTOF10: 4

## 2023-10-30 ASSESSMENT — PAIN DESCRIPTION - DESCRIPTORS: DESCRIPTORS: ACHING;SORE

## 2023-10-30 NOTE — LACTATION NOTE
This note was copied from a baby's chart. Baby nursing well and has improved throughout post partum stay, deep latch maintained, milk is in transition, baby feeding vigorously with rhythmic suck, swallow, breathe pattern, with audible swallowing, and evident milk transfer, both breasts offered, baby is asleep following feeding. Baby is feeding on demand, voiding and stools present as appropriate since birth. Weight loss:  8.3!%  Infant noted to have a visible frenulum with good tongue elevation. She is able to maintain a deep latch on gloved finger and breast. Mom noted to have abrased nipples and is using Vergil Sabot Vasquez's Cream. Observed a latch in football position; deep latch obtained and infant was able to maintain latch well. Mom states latch is tender initially, but discomfort subsides. Breasts may become engorged when milk \"comes in\". How milk is made / normal phases of milk production, supply and demand discussed. Taught care of engorged breasts - frequent breastfeeding encouraged and breast massage ac. Then nurse the baby (or pump minimally for comfort). Apply cold compresses ac and/or pc x 15 minutes a few times a day for swelling or discomfort if necessary. May need to do this care for a couple of days. Discussed prevention and treatment of mastitis.

## 2023-10-30 NOTE — DISCHARGE SUMMARY
PO) Comments:   Reason for Stopping:               Reference my discharge instructions. No follow-ups on file.      Signed By:  Sophie Rendon MD     October 30, 2023

## 2023-10-30 NOTE — PROGRESS NOTES
1232: Pt's last two BP's 140's/90's, pt asymptomatic. Dr. Say Roman notified, pt okay to be discharged. I have reviewed and agree with the documentation done by Mikal FOFANA.

## 2023-12-06 ENCOUNTER — POSTPARTUM VISIT (OUTPATIENT)
Age: 33
End: 2023-12-06

## 2023-12-06 VITALS — BODY MASS INDEX: 30.72 KG/M2 | SYSTOLIC BLOOD PRESSURE: 118 MMHG | DIASTOLIC BLOOD PRESSURE: 78 MMHG | WEIGHT: 208 LBS

## 2023-12-06 PROCEDURE — 0503F POSTPARTUM CARE VISIT: CPT | Performed by: OBSTETRICS & GYNECOLOGY

## 2023-12-09 LAB
CYTOLOGIST CVX/VAG CYTO: ABNORMAL
CYTOLOGY CVX/VAG DOC CYTO: ABNORMAL
CYTOLOGY CVX/VAG DOC THIN PREP: ABNORMAL
DX ICD CODE: ABNORMAL
DX ICD CODE: ABNORMAL
HPV GENOTYPE REFLEX: ABNORMAL
HPV I/H RISK 4 DNA CVX QL PROBE+SIG AMP: NEGATIVE
Lab: ABNORMAL
OTHER STN SPEC: ABNORMAL
PATHOLOGIST CVX/VAG CYTO: ABNORMAL
STAT OF ADQ CVX/VAG CYTO-IMP: ABNORMAL

## 2024-01-04 ENCOUNTER — HOSPITAL ENCOUNTER (OUTPATIENT)
Dept: PHYSICAL THERAPY | Facility: HOSPITAL | Age: 34
Setting detail: RECURRING SERIES
Discharge: HOME | End: 2024-01-07
Attending: FAMILY MEDICINE
Payer: COMMERCIAL

## 2024-01-04 PROCEDURE — 97162 PT EVAL MOD COMPLEX 30 MIN: CPT

## 2024-01-05 ENCOUNTER — HOSPITAL ENCOUNTER (OUTPATIENT)
Facility: HOSPITAL | Age: 34
Discharge: HOME | End: 2024-01-05
Attending: FAMILY MEDICINE
Payer: COMMERCIAL

## 2024-01-05 DIAGNOSIS — R25.2 CRAMPING OF FEET: ICD-10-CM

## 2024-01-05 LAB
VAS LEFT ABI: 1.1
VAS LEFT ARM BP: 115 MMHG
VAS LEFT DORSALIS PEDIS BP: 129 MMHG
VAS LEFT PTA BP: 138 MMHG
VAS RIGHT ABI: 1.08
VAS RIGHT ARM BP: 126 MMHG
VAS RIGHT DORSALIS PEDIS BP: 129 MMHG
VAS RIGHT PTA BP: 136 MMHG

## 2024-01-05 PROCEDURE — 93922 UPR/L XTREMITY ART 2 LEVELS: CPT

## 2024-01-08 ENCOUNTER — HOSPITAL ENCOUNTER (OUTPATIENT)
Dept: PHYSICAL THERAPY | Facility: HOSPITAL | Age: 34
Setting detail: RECURRING SERIES
Discharge: HOME | End: 2024-01-11
Attending: FAMILY MEDICINE
Payer: COMMERCIAL

## 2024-01-08 PROCEDURE — 97110 THERAPEUTIC EXERCISES: CPT

## 2024-01-08 PROCEDURE — 97140 MANUAL THERAPY 1/> REGIONS: CPT

## 2024-01-08 NOTE — PROGRESS NOTES
PHYSICAL THERAPY - MEDICARE DAILY TREATMENT NOTE (updated 3/23)      Date: 2024          Patient Name:  Mian Asher :  1990   Medical   Diagnosis:  Posterior tibial tendinitis of both lower extremities [M76.821, M76.822]  Cramping of feet [R25.2] Treatment Diagnosis:  M79.671  RIGHT FOOT PAIN and M79.672  LEFT FOOT PAIN    Referral Source:  Dejon Lloyd MD Insurance:   Payor: ALLIED BENEFIT SYSTEM / Plan: ALLIED BENEFIT SYSTEM / Product Type: *No Product type* /                     Patient  verified yes     Visit #   Current  / Total 2 24   Time   In / Out 9:00 AM 9:48 AM   Total Treatment Time 48 minutes   Total Timed Codes 48 minutes   1:1 Treatment Time 43 minutes      [facility] Totals Reminder:  bill using total billable   min of TIMED therapeutic procedures and modalities.   8-22 min = 1 unit; 23-37 min = 2 units; 38-52 min = 3 units; 53-67 min = 4 units; 68-82 min = 5 units            SUBJECTIVE    Pain Level (0-10 scale): 2-3/10    Any medication changes, allergies to medications, adverse drug reactions, diagnosis change, or new procedure performed?: [x] No    [] Yes (see summary sheet for update)  Medications: Verified on Patient Summary List    Subjective functional status/changes:     The Pt denies any significant changes to her symptoms since her initial evaluation.    OBJECTIVE      Therapeutic Procedures:  Tx Min Billable or 1:1 Min (if diff from Tx Min) Procedure, Rationale, Specifics   33 28 [id number] Therapeutic Exercise (timed):  increase ROM, strength, coordination, balance, and proprioception to improve patient's ability to progress to PLOF and address remaining functional goals. (see flow sheet as applicable)     Details if applicable:     15 15 05214 Manual Therapy (timed):  decrease pain, increase ROM, increase tissue extensibility, decrease trigger points, and increase postural awareness to improve patient's ability to progress to PLOF and address remaining functional goals.  The

## 2024-01-10 ENCOUNTER — HOSPITAL ENCOUNTER (OUTPATIENT)
Dept: PHYSICAL THERAPY | Facility: HOSPITAL | Age: 34
Setting detail: RECURRING SERIES
Discharge: HOME | End: 2024-01-13
Attending: FAMILY MEDICINE
Payer: COMMERCIAL

## 2024-01-10 PROCEDURE — 97110 THERAPEUTIC EXERCISES: CPT

## 2024-01-10 PROCEDURE — 97140 MANUAL THERAPY 1/> REGIONS: CPT

## 2024-01-10 PROCEDURE — 97112 NEUROMUSCULAR REEDUCATION: CPT

## 2024-01-10 NOTE — PROGRESS NOTES
functional goals. (see flow sheet as applicable)    Details if applicable:  BAPS board bilaterally, TA activation + SS,      28 28 [id number] Manual Therapy (timed):  decrease pain, increase ROM, increase tissue extensibility, decrease trigger points, and increase postural awareness to improve patient's ability to progress to PLOF and address remaining functional goals.  The manual therapy interventions were performed at a separate and distinct time from the therapeutic activities interventions . (see flow sheet as applicable)     Details if applicable:    B posterior distal fibular glides  B posterior talar glide  B calcaneal EV/IV  B cuboid and navicular pronation/supination  B gastroc, peroneal, and posterior tibialis STM/MFR in prone   64 59    Total Total         [x]  Patient Education billed concurrently with other procedures   [x] Review HEP    [] Progressed/Changed HEP, detail:    [] Other detail:         Other Objective/Functional Measures  --    Pain Level at end of session (0-10 scale): 3/10 RLE, 2/10 LLE      Assessment   Pt responded well to manual therapy to B gastrocs and talocrural mobilizations. Instructed in use of resistance band for talocrural mobs at home with lunges to assist with mobilization. Patient reported full DF range without pinching in weightbearing after manual interventions. Introduced BAPS board training with quick fatigue.   Patient will continue to benefit from skilled PT / OT services to modify and progress therapeutic interventions, analyze and address functional mobility deficits, analyze and address ROM deficits, analyze and address strength deficits, analyze and address soft tissue restrictions, analyze and cue for proper movement patterns, analyze and modify for postural abnormalities, and instruct in home and community integration to address functional deficits and attain remaining goals.    Progress toward goals / Updated goals:  []  See Progress Note/Recertification    Short

## 2024-01-15 ENCOUNTER — HOSPITAL ENCOUNTER (OUTPATIENT)
Dept: PHYSICAL THERAPY | Facility: HOSPITAL | Age: 34
Setting detail: RECURRING SERIES
Discharge: HOME | End: 2024-01-18
Attending: FAMILY MEDICINE
Payer: COMMERCIAL

## 2024-01-15 PROCEDURE — 97140 MANUAL THERAPY 1/> REGIONS: CPT

## 2024-01-15 PROCEDURE — 97112 NEUROMUSCULAR REEDUCATION: CPT

## 2024-01-15 PROCEDURE — 97110 THERAPEUTIC EXERCISES: CPT

## 2024-01-15 NOTE — PROGRESS NOTES
interventions, analyze and address functional mobility deficits, analyze and address ROM deficits, analyze and address strength deficits, analyze and address soft tissue restrictions, analyze and cue for proper movement patterns, analyze and modify for postural abnormalities, and instruct in home and community integration to address functional deficits and attain remaining goals.    Progress toward goals / Updated goals:  []  See Progress Note/Recertification    Short Term Goals: To be accomplished in 6-8 treatments.  1. The Pt will be independent and compliant with their HEP- progressing  2. The Pt will report a 50% reduction in their pain with ADLs- progressing  Long Term Goals: To be accomplished in 20-24 treatments.  The Pt will score the MCII on her FOTO survey demonstrating improved overall function (47 to 59 points)- progressing  The Pt will be able to stand >/= 45 minutes with 0-2/10 pain to allow the Pt to be able to perform standing ADLs with less pain or discomfort- progressing  The Pt will be able to walk >/= 30 minutes with 0-2/10 pain to allow the Pt to be able to walk for recreation- progressing  The Pt will have >/= 10 degrees of active dorsiflexion to allow the Pt to be able to walk without compensation- progressing      PLAN  Yes  Continue plan of care  Re-Cert Due: NA  [x]  Upgrade activities as tolerated  []  Discharge due to :  []  Other:      Danielle Maldonado, PT       1/15/2024       9:06 AM

## 2024-01-17 ENCOUNTER — HOSPITAL ENCOUNTER (OUTPATIENT)
Dept: PHYSICAL THERAPY | Facility: HOSPITAL | Age: 34
Setting detail: RECURRING SERIES
Discharge: HOME | End: 2024-01-20
Attending: FAMILY MEDICINE
Payer: COMMERCIAL

## 2024-01-17 PROCEDURE — 97140 MANUAL THERAPY 1/> REGIONS: CPT

## 2024-01-17 PROCEDURE — 97112 NEUROMUSCULAR REEDUCATION: CPT

## 2024-01-17 PROCEDURE — 97110 THERAPEUTIC EXERCISES: CPT

## 2024-01-17 NOTE — PROGRESS NOTES
PHYSICAL THERAPY - MEDICARE DAILY TREATMENT NOTE (updated 3/23)      Date: 2024          Patient Name:  Mian Asher :  1990   Medical   Diagnosis:  Posterior tibial tendinitis of both lower extremities [M76.821, M76.822]  Cramping of feet [R25.2] Treatment Diagnosis:  M79.671  RIGHT FOOT PAIN and M79.672  LEFT FOOT PAIN    Referral Source:  Dejon Lloyd MD Insurance:   Payor: ALLIED BENEFIT SYSTEM / Plan: ALLIED BENEFIT SYSTEM / Product Type: *No Product type* /                     Patient  verified yes     Visit #   Current  / Total 5 24   Time   In / Out 1015 1128   Total Treatment Time 73 minutes   Total Timed Codes 73 minutes   1:1 Treatment Time 68 minutes      [facility] Totals Reminder:  bill using total billable   min of TIMED therapeutic procedures and modalities.   8-22 min = 1 unit; 23-37 min = 2 units; 38-52 min = 3 units; 53-67 min = 4 units; 68-82 min = 5 units            SUBJECTIVE    Pain Level (0-10 scale): 3/10    Any medication changes, allergies to medications, adverse drug reactions, diagnosis change, or new procedure performed?: [x] No    [] Yes (see summary sheet for update)  Medications: Verified on Patient Summary List    Subjective functional status/changes:     Pt reports that she woke up feeling pretty tight today. She reports possibly less pain but more tightness.     OBJECTIVE      Therapeutic Procedures:  Tx Min Billable or 1:1 Min (if diff from Tx Min) Procedure, Rationale, Specifics   20 15 [id number] Therapeutic Exercise (timed):  increase ROM, strength, coordination, balance, and proprioception to improve patient's ability to progress to PLOF and address remaining functional goals. (see flow sheet as applicable)     Details if applicable:     112 Neuromuscular Re-Education (timed):  improve balance, coordination, kinesthetic sense, posture, core stability and proprioception to improve patient's ability to develop conscious control of individual muscles and

## 2024-01-22 ENCOUNTER — HOSPITAL ENCOUNTER (OUTPATIENT)
Dept: PHYSICAL THERAPY | Facility: HOSPITAL | Age: 34
Setting detail: RECURRING SERIES
Discharge: HOME | End: 2024-01-25
Attending: FAMILY MEDICINE
Payer: COMMERCIAL

## 2024-01-22 PROCEDURE — 97140 MANUAL THERAPY 1/> REGIONS: CPT

## 2024-01-22 PROCEDURE — 97110 THERAPEUTIC EXERCISES: CPT

## 2024-01-22 PROCEDURE — 97112 NEUROMUSCULAR REEDUCATION: CPT

## 2024-01-22 NOTE — PROGRESS NOTES
PHYSICAL THERAPY - MEDICARE DAILY TREATMENT NOTE (updated 3/23)      Date: 2024          Patient Name:  Mian Asher :  1990   Medical   Diagnosis:  Posterior tibial tendinitis of both lower extremities [M76.821, M76.822]  Cramping of feet [R25.2] Treatment Diagnosis:  M79.671  RIGHT FOOT PAIN and M79.672  LEFT FOOT PAIN    Referral Source:  Dejon Lloyd MD Insurance:   Payor: ALLIED BENEFIT SYSTEM / Plan: ALLIED BENEFIT SYSTEM / Product Type: *No Product type* /                     Patient  verified yes     Visit #   Current  / Total 6 24   Time   In / Out 0851 1000   Total Treatment Time 69   Total Timed Codes 69   1:1 Treatment Time 54      [facility] Totals Reminder:  bill using total billable   min of TIMED therapeutic procedures and modalities.   8-22 min = 1 unit; 23-37 min = 2 units; 38-52 min = 3 units; 53-67 min = 4 units; 68-82 min = 5 units            SUBJECTIVE    Pain Level (0-10 scale): 2/10    Any medication changes, allergies to medications, adverse drug reactions, diagnosis change, or new procedure performed?: [x] No    [] Yes (see summary sheet for update)  Medications: Verified on Patient Summary List    Subjective functional status/changes:     Pt reports that she wore \"terrible\" shoes yesterday and walked all around in them and it didn't flare the right foot up as much as she thought it would.     OBJECTIVE      Therapeutic Procedures:  Tx Min Billable or 1:1 Min (if diff from Tx Min) Procedure, Rationale, Specifics   10 10 00006 Therapeutic Exercise (timed):  increase ROM, strength, coordination, balance, and proprioception to improve patient's ability to progress to PLOF and address remaining functional goals. (see flow sheet as applicable)     Details if applicable:     112 Neuromuscular Re-Education (timed):  improve balance, coordination, kinesthetic sense, posture, core stability and proprioception to improve patient's ability to develop conscious control of

## 2024-01-25 ENCOUNTER — HOSPITAL ENCOUNTER (OUTPATIENT)
Dept: PHYSICAL THERAPY | Facility: HOSPITAL | Age: 34
Setting detail: RECURRING SERIES
Discharge: HOME | End: 2024-01-28
Attending: FAMILY MEDICINE
Payer: COMMERCIAL

## 2024-01-25 PROCEDURE — 97112 NEUROMUSCULAR REEDUCATION: CPT

## 2024-01-25 PROCEDURE — 97110 THERAPEUTIC EXERCISES: CPT

## 2024-01-25 PROCEDURE — 97140 MANUAL THERAPY 1/> REGIONS: CPT

## 2024-01-25 PROCEDURE — 97016 VASOPNEUMATIC DEVICE THERAPY: CPT

## 2024-01-25 NOTE — PROGRESS NOTES
[]redness - adverse reaction:               [x]  Patient Education billed concurrently with other procedures   [x] Review HEP    [] Progressed/Changed HEP, detail:    [] Other detail:         Other Objective/Functional Measures  --    Pain Level at end of session (0-10 scale): 2-3/10 \"better\"      Assessment   Pt tolerated session well. Significant challenge with ball on wall for glut engagement with RLE fatiguing significantly after 2-3 reps. She was prone to L TA not engaging during supine strengthening,   Patient will continue to benefit from skilled PT / OT services to modify and progress therapeutic interventions, analyze and address functional mobility deficits, analyze and address ROM deficits, analyze and address strength deficits, analyze and address soft tissue restrictions, analyze and cue for proper movement patterns, analyze and modify for postural abnormalities, and instruct in home and community integration to address functional deficits and attain remaining goals.    Progress toward goals / Updated goals:  []  See Progress Note/Recertification    Short Term Goals: To be accomplished in 6-8 treatments.  1. The Pt will be independent and compliant with their HEP- progressing  2. The Pt will report a 50% reduction in their pain with ADLs- progressing    Long Term Goals: To be accomplished in 20-24 treatments.  The Pt will score the MCII on her FOTO survey demonstrating improved overall function (47 to 59 points)- progressing  The Pt will be able to stand >/= 45 minutes with 0-2/10 pain to allow the Pt to be able to perform standing ADLs with less pain or discomfort- progressing  The Pt will be able to walk >/= 30 minutes with 0-2/10 pain to allow the Pt to be able to walk for recreation- progressing  The Pt will have >/= 10 degrees of active dorsiflexion to allow the Pt to be able to walk without compensation- progressing      PLAN  Yes  Continue plan of care  Re-Cert Due: NA  [x]  Upgrade

## 2024-01-30 ENCOUNTER — HOSPITAL ENCOUNTER (OUTPATIENT)
Dept: PHYSICAL THERAPY | Facility: HOSPITAL | Age: 34
Setting detail: RECURRING SERIES
Discharge: HOME | End: 2024-02-02
Attending: FAMILY MEDICINE
Payer: COMMERCIAL

## 2024-01-30 PROCEDURE — 97112 NEUROMUSCULAR REEDUCATION: CPT

## 2024-01-30 PROCEDURE — 97140 MANUAL THERAPY 1/> REGIONS: CPT

## 2024-01-30 PROCEDURE — 97110 THERAPEUTIC EXERCISES: CPT

## 2024-01-30 NOTE — PROGRESS NOTES
of position of extremities in order to progress to PLOF and address remaining functional goals. (see flow sheet as applicable)    Details if applicable:  TA in supine with knee ext, BAPS board, core press, rebounder with TA, dead bug isometrics,      20 20 08846 Manual Therapy (timed):  decrease pain, increase ROM, increase tissue extensibility, decrease trigger points, and increase postural awareness to improve patient's ability to progress to PLOF and address remaining functional goals.  The manual therapy interventions were performed at a separate and distinct time from the therapeutic activities interventions . (see flow sheet as applicable)     Details if applicable:    B posterior distal fibular glides  B posterior talar glide  B calcaneal EV/IV  B cuboid and navicular pronation/supination  B gastroc, peroneal, and posterior tibialis STM/MFR in prone   63 58    Total Total          [x]  Patient Education billed concurrently with other procedures   [x] Review HEP    [] Progressed/Changed HEP, detail:    [] Other detail:         Other Objective/Functional Measures  --    Pain Level at end of session (0-10 scale): 2/10       Assessment   Pt tolerated session well. She continues to be very challenged with left TA engagement and it fatigues quickly with L QL and L adductor compensation. Introduced rebounder with TA activation as well.   Patient will continue to benefit from skilled PT / OT services to modify and progress therapeutic interventions, analyze and address functional mobility deficits, analyze and address ROM deficits, analyze and address strength deficits, analyze and address soft tissue restrictions, analyze and cue for proper movement patterns, analyze and modify for postural abnormalities, and instruct in home and community integration to address functional deficits and attain remaining goals.    Progress toward goals / Updated goals:  []  See Progress Note/Recertification    Short Term Goals: To be

## 2024-02-01 ENCOUNTER — HOSPITAL ENCOUNTER (OUTPATIENT)
Dept: PHYSICAL THERAPY | Facility: HOSPITAL | Age: 34
Setting detail: RECURRING SERIES
Discharge: HOME | End: 2024-02-04
Attending: FAMILY MEDICINE
Payer: COMMERCIAL

## 2024-02-01 PROCEDURE — 97112 NEUROMUSCULAR REEDUCATION: CPT

## 2024-02-01 PROCEDURE — 97110 THERAPEUTIC EXERCISES: CPT

## 2024-02-01 NOTE — PROGRESS NOTES
PHYSICAL THERAPY - MEDICARE DAILY TREATMENT NOTE (updated 3/23)      Date: 2024          Patient Name:  Mian Asher :  1990   Medical   Diagnosis:  Posterior tibial tendinitis of both lower extremities [M76.821, M76.822]  Cramping of feet [R25.2] Treatment Diagnosis:  M79.671  RIGHT FOOT PAIN and M79.672  LEFT FOOT PAIN    Referral Source:  Dejon Lloyd MD Insurance:   Payor: ALLIED BENEFIT SYSTEM / Plan: ALLIED BENEFIT SYSTEM / Product Type: *No Product type* /                     Patient  verified yes     Visit #   Current  / Total 9 24   Time   In / Out 0835 0935   Total Treatment Time 60   Total Timed Codes 60   1:1 Treatment Time 55      [facility] Totals Reminder:  bill using total billable   min of TIMED therapeutic procedures and modalities.   8-22 min = 1 unit; 23-37 min = 2 units; 38-52 min = 3 units; 53-67 min = 4 units; 68-82 min = 5 units            SUBJECTIVE    Pain Level (0-10 scale): 4-5/10    Any medication changes, allergies to medications, adverse drug reactions, diagnosis change, or new procedure performed?: [x] No    [] Yes (see summary sheet for update)  Medications: Verified on Patient Summary List    Subjective functional status/changes:     Pt reports increased sensitivity and pain over her L posterior tibialis since last treatment session. She also worked Tuesday and yesterday that may have contributed to increased flair.       OBJECTIVE      Therapeutic Procedures:  Tx Min Billable or 1:1 Min (if diff from Tx Min) Procedure, Rationale, Specifics   35 30 80136 Therapeutic Exercise (timed):  increase ROM, strength, coordination, balance, and proprioception to improve patient's ability to progress to PLOF and address remaining functional goals. (see flow sheet as applicable)     Details if applicable:     25  81797 Neuromuscular Re-Education (timed):  improve balance, coordination, kinesthetic sense, posture, core stability and proprioception to improve patient's ability to

## 2024-02-05 ENCOUNTER — APPOINTMENT (OUTPATIENT)
Dept: PHYSICAL THERAPY | Facility: HOSPITAL | Age: 34
End: 2024-02-05
Attending: FAMILY MEDICINE
Payer: COMMERCIAL

## 2024-02-07 RX ORDER — ESTRADIOL 10 UG/1
10 INSERT VAGINAL
Qty: 8 TABLET | Refills: 3 | Status: SHIPPED | OUTPATIENT
Start: 2024-02-08

## 2024-02-08 ENCOUNTER — HOSPITAL ENCOUNTER (OUTPATIENT)
Dept: PHYSICAL THERAPY | Facility: HOSPITAL | Age: 34
Setting detail: RECURRING SERIES
Discharge: HOME | End: 2024-02-11
Attending: FAMILY MEDICINE
Payer: COMMERCIAL

## 2024-02-08 PROCEDURE — 97140 MANUAL THERAPY 1/> REGIONS: CPT | Performed by: PHYSICAL THERAPIST

## 2024-02-08 PROCEDURE — 97110 THERAPEUTIC EXERCISES: CPT | Performed by: PHYSICAL THERAPIST

## 2024-02-08 PROCEDURE — 97112 NEUROMUSCULAR REEDUCATION: CPT | Performed by: PHYSICAL THERAPIST

## 2024-02-08 NOTE — PROGRESS NOTES
Physical Therapy at [facility],   a part of [address], [address]  [place] [address]  Phone: [phone]  Fax: [phone]      PHYSICAL THERAPY PROGRESS NOTE  Patient Name:  Mian Asher :  1990   Treatment/Medical Diagnosis: Posterior tibial tendinitis of both lower extremities [M76.821, M76.822]  Cramping of feet [R25.2]   Referral Source:  Dejon Lloyd MD     Date of Initial Visit:  24 Attended Visits:  10 Missed Visits:  0     SUMMARY OF TREATMENT/ASSESSMENT:  Overall, patient is making slow but good progress. She reports improvement in how much she an do at home or at work before increased pain, but no significant improvement in her overall pain status at this time. Emphasis on core and hip strength, though she continues to fatigue very quickly and is prone to compensation with adductors and lower leg musculature when in standing. She does report improvement in B foot pain with navicular sling taping. Therapist anticipates that core and hip instability is a large contributor to her chronic posterior tibialis pain.      CURRENT STATUS  FOTO score: 55/100 (47)      Right Ankle ROM:                 AROM                         PROM              DF                               6                                 10              PF                                50                                NT     Left Ankle ROM:                    AROM                         PROM              DF                               8                                   12              PF                               54                                NT    Short Term Goals: To be accomplished in 6-8 treatments.  1. The Pt will be independent and compliant with their HEP- progressing  2. The Pt will report a 50% reduction in their pain with ADLs- progressing     Long Term Goals: To be accomplished in 20-24 treatments.  The Pt will score the MCII on her

## 2024-02-08 NOTE — PROGRESS NOTES
PHYSICAL THERAPY - MEDICARE DAILY TREATMENT NOTE (updated 3/23)      Date: 2024          Patient Name:  Mian Asher :  1990   Medical   Diagnosis:  Posterior tibial tendinitis of both lower extremities [M76.821, M76.822]  Cramping of feet [R25.2] Treatment Diagnosis:  M79.671  RIGHT FOOT PAIN and M79.672  LEFT FOOT PAIN    Referral Source:  Dejon Lloyd MD Insurance:   Payor: ALLIED BENEFIT SYSTEM / Plan: ALLIED BENEFIT SYSTEM / Product Type: *No Product type* /                     Patient  verified yes     Visit #   Current  / Total 10 24   Time   In / Out 0826 0929   Total Treatment Time 66   Total Timed Codes 66   1:1 Treatment Time 61      [facility] Totals Reminder:  bill using total billable   min of TIMED therapeutic procedures and modalities.   8-22 min = 1 unit; 23-37 min = 2 units; 38-52 min = 3 units; 53-67 min = 4 units; 68-82 min = 5 units            SUBJECTIVE    Pain Level (0-10 scale): 3/10    Any medication changes, allergies to medications, adverse drug reactions, diagnosis change, or new procedure performed?: [x] No    [] Yes (see summary sheet for update)  Medications: Verified on Patient Summary List    Subjective functional status/changes:     Pt reports continues sensitivity over her L posterior tibialis but it is better than it was last week.     OBJECTIVE      Therapeutic Procedures:  Tx Min Billable or 1:1 Min (if diff from Tx Min) Procedure, Rationale, Specifics   33 69 [id number] Therapeutic Exercise (timed):  increase ROM, strength, coordination, balance, and proprioception to improve patient's ability to progress to PLOF and address remaining functional goals. (see flow sheet as applicable)     Details if applicable:     8  90323 Manual Therapy (timed):  decrease pain, increase ROM, and increase tissue extensibility to improve patient's ability to progress to PLOF and address remaining functional goals.  The manual therapy interventions were performed at a separate and

## 2024-02-12 ENCOUNTER — HOSPITAL ENCOUNTER (OUTPATIENT)
Dept: PHYSICAL THERAPY | Facility: HOSPITAL | Age: 34
Setting detail: RECURRING SERIES
Discharge: HOME | End: 2024-02-15
Attending: FAMILY MEDICINE
Payer: COMMERCIAL

## 2024-02-12 PROCEDURE — 97112 NEUROMUSCULAR REEDUCATION: CPT | Performed by: PHYSICAL THERAPIST

## 2024-02-12 PROCEDURE — 97110 THERAPEUTIC EXERCISES: CPT | Performed by: PHYSICAL THERAPIST

## 2024-02-12 NOTE — PROGRESS NOTES
PHYSICAL THERAPY - MEDICARE DAILY TREATMENT NOTE (updated 3/23)      Date: 2024          Patient Name:  Mian Asher :  1990   Medical   Diagnosis:  Posterior tibial tendinitis of both lower extremities [M76.821, M76.822]  Cramping of feet [R25.2] Treatment Diagnosis:  M79.671  RIGHT FOOT PAIN and M79.672  LEFT FOOT PAIN    Referral Source:  Dejon Lloyd MD Insurance:   Payor: ALLIED BENEFIT SYSTEM / Plan: ALLIED BENEFIT SYSTEM / Product Type: *No Product type* /                     Patient  verified yes     Visit #   Current  / Total 11 24   Time   In / Out 1104 1155   Total Treatment Time 51   Total Timed Codes 51   1:1 Treatment Time 46      [facility] Totals Reminder:  bill using total billable   min of TIMED therapeutic procedures and modalities.   8-22 min = 1 unit; 23-37 min = 2 units; 38-52 min = 3 units; 53-67 min = 4 units; 68-82 min = 5 units            SUBJECTIVE    Pain Level (0-10 scale): 3/10    Any medication changes, allergies to medications, adverse drug reactions, diagnosis change, or new procedure performed?: [x] No    [] Yes (see summary sheet for update)  Medications: Verified on Patient Summary List    Subjective functional status/changes:     Pt reports that she was able to walk around the mall for two hours without an increase in pain in her feet the next day.     OBJECTIVE      Therapeutic Procedures:  Tx Min Billable or 1:1 Min (if diff from Tx Min) Procedure, Rationale, Specifics    68883 Therapeutic Exercise (timed):  increase ROM, strength, coordination, balance, and proprioception to improve patient's ability to progress to PLOF and address remaining functional goals. (see flow sheet as applicable)     Details if applicable:     30  30426 Neuromuscular Re-Education (timed):  improve balance, coordination, kinesthetic sense, posture, core stability and proprioception to improve patient's ability to develop conscious control of individual muscles and awareness of

## 2024-02-15 ENCOUNTER — HOSPITAL ENCOUNTER (OUTPATIENT)
Dept: PHYSICAL THERAPY | Facility: HOSPITAL | Age: 34
Setting detail: RECURRING SERIES
Discharge: HOME | End: 2024-02-18
Attending: FAMILY MEDICINE
Payer: COMMERCIAL

## 2024-02-15 PROCEDURE — 97112 NEUROMUSCULAR REEDUCATION: CPT | Performed by: PHYSICAL THERAPIST

## 2024-02-15 PROCEDURE — 97110 THERAPEUTIC EXERCISES: CPT | Performed by: PHYSICAL THERAPIST

## 2024-02-15 NOTE — PROGRESS NOTES
PHYSICAL THERAPY - MEDICARE DAILY TREATMENT NOTE (updated 3/23)      Date: 2/15/2024          Patient Name:  Mian Asher :  1990   Medical   Diagnosis:  Posterior tibial tendinitis of both lower extremities [M76.821, M76.822]  Cramping of feet [R25.2] Treatment Diagnosis:  M79.671  RIGHT FOOT PAIN and M79.672  LEFT FOOT PAIN    Referral Source:  Dejon Lloyd MD Insurance:   Payor: ALLIED BENEFIT SYSTEM / Plan: ALLIED BENEFIT SYSTEM / Product Type: *No Product type* /                     Patient  verified yes     Visit #   Current  / Total 12 24   Time   In / Out 1010 1105   Total Treatment Time 55   Total Timed Codes 55   1:1 Treatment Time 53      [facility] Totals Reminder:  bill using total billable   min of TIMED therapeutic procedures and modalities.   8-22 min = 1 unit; 23-37 min = 2 units; 38-52 min = 3 units; 53-67 min = 4 units; 68-82 min = 5 units            SUBJECTIVE    Pain Level (0-10 scale): 2/10    Any medication changes, allergies to medications, adverse drug reactions, diagnosis change, or new procedure performed?: [x] No    [] Yes (see summary sheet for update)  Medications: Verified on Patient Summary List    Subjective functional status/changes:     Pt reports that she had an \"ah-ha\" moment yesterday that she was able to tolerate more than she thought after two days of working and walking around in shoes without good support.     OBJECTIVE      Therapeutic Procedures:  Tx Min Billable or 1:1 Min (if diff from Tx Min) Procedure, Rationale, Specifics   25 23 54033 Therapeutic Exercise (timed):  increase ROM, strength, coordination, balance, and proprioception to improve patient's ability to progress to PLOF and address remaining functional goals. (see flow sheet as applicable)     Details if applicable:     30 30 33677 Neuromuscular Re-Education (timed):  improve balance, coordination, kinesthetic sense, posture, core stability and proprioception to improve patient's ability to develop

## 2024-02-20 ENCOUNTER — HOSPITAL ENCOUNTER (OUTPATIENT)
Dept: PHYSICAL THERAPY | Facility: HOSPITAL | Age: 34
Setting detail: RECURRING SERIES
Discharge: HOME | End: 2024-02-23
Attending: FAMILY MEDICINE
Payer: COMMERCIAL

## 2024-02-20 PROCEDURE — 97112 NEUROMUSCULAR REEDUCATION: CPT | Performed by: PHYSICAL THERAPIST

## 2024-02-20 PROCEDURE — 97110 THERAPEUTIC EXERCISES: CPT | Performed by: PHYSICAL THERAPIST

## 2024-02-20 NOTE — PROGRESS NOTES
PHYSICAL THERAPY - MEDICARE DAILY TREATMENT NOTE (updated 3/23)      Date: 2024          Patient Name:  Mian Asher :  1990   Medical   Diagnosis:  Posterior tibial tendinitis of both lower extremities [M76.821, M76.822]  Cramping of feet [R25.2] Treatment Diagnosis:  M79.671  RIGHT FOOT PAIN and M79.672  LEFT FOOT PAIN    Referral Source:  Dejon Lloyd MD Insurance:   Payor: ALLIED BENEFIT SYSTEM / Plan: ALLIED BENEFIT SYSTEM / Product Type: *No Product type* /                     Patient  verified yes     Visit #   Current  / Total 13 24   Time   In / Out 0700 0755   Total Treatment Time 55   Total Timed Codes 55   1:1 Treatment Time 55      [facility] Totals Reminder:  bill using total billable   min of TIMED therapeutic procedures and modalities.   8-22 min = 1 unit; 23-37 min = 2 units; 38-52 min = 3 units; 53-67 min = 4 units; 68-82 min = 5 units            SUBJECTIVE    Pain Level (0-10 scale): 1/10    Any medication changes, allergies to medications, adverse drug reactions, diagnosis change, or new procedure performed?: [x] No    [] Yes (see summary sheet for update)  Medications: Verified on Patient Summary List    Subjective functional status/changes:     Pt reports that her foot has been doing ok lately.    OBJECTIVE      Therapeutic Procedures:  Tx Min Billable or 1:1 Min (if diff from Tx Min) Procedure, Rationale, Specifics   25 25 24677 Therapeutic Exercise (timed):  increase ROM, strength, coordination, balance, and proprioception to improve patient's ability to progress to PLOF and address remaining functional goals. (see flow sheet as applicable)     Details if applicable:     30 30 15116 Neuromuscular Re-Education (timed):  improve balance, coordination, kinesthetic sense, posture, core stability and proprioception to improve patient's ability to develop conscious control of individual muscles and awareness of position of extremities in order to progress to PLOF and address remaining

## 2024-02-22 ENCOUNTER — HOSPITAL ENCOUNTER (OUTPATIENT)
Dept: PHYSICAL THERAPY | Facility: HOSPITAL | Age: 34
Setting detail: RECURRING SERIES
Discharge: HOME | End: 2024-02-25
Attending: FAMILY MEDICINE
Payer: COMMERCIAL

## 2024-02-22 PROCEDURE — 97112 NEUROMUSCULAR REEDUCATION: CPT | Performed by: PHYSICAL THERAPIST

## 2024-02-22 PROCEDURE — 97016 VASOPNEUMATIC DEVICE THERAPY: CPT | Performed by: PHYSICAL THERAPIST

## 2024-02-22 PROCEDURE — 97110 THERAPEUTIC EXERCISES: CPT | Performed by: PHYSICAL THERAPIST

## 2024-02-22 NOTE — PROGRESS NOTES
interventions, analyze and address functional mobility deficits, analyze and address ROM deficits, analyze and address strength deficits, analyze and address soft tissue restrictions, analyze and cue for proper movement patterns, analyze and modify for postural abnormalities, and instruct in home and community integration to address functional deficits and attain remaining goals.    Progress toward goals / Updated goals:  [x]  See Progress Note/Recertification    Short Term Goals: To be accomplished in 6-8 treatments.  1. The Pt will be independent and compliant with their HEP- progressing  2. The Pt will report a 50% reduction in their pain with ADLs- progressing     Long Term Goals: To be accomplished in 20-24 treatments.  The Pt will score the MCII on her FOTO survey demonstrating improved overall function (47 to 59 points)- progressing  The Pt will be able to stand >/= 45 minutes with 0-2/10 pain to allow the Pt to be able to perform standing ADLs with less pain or discomfort- progressing  The Pt will be able to walk >/= 30 minutes with 0-2/10 pain to allow the Pt to be able to walk for recreation- progressing  The Pt will have >/= 10 degrees of active dorsiflexion to allow the Pt to be able to walk without compensation- progressing well       PLAN  Yes  Continue plan of care  Re-Cert Due: NA  [x]  Upgrade activities as tolerated  []  Discharge due to :  []  Other:      Brooks Atkinson, PT       2/22/2024       7:30 AM

## 2024-02-29 ENCOUNTER — APPOINTMENT (OUTPATIENT)
Dept: PHYSICAL THERAPY | Facility: HOSPITAL | Age: 34
End: 2024-02-29
Attending: FAMILY MEDICINE
Payer: COMMERCIAL

## 2024-03-04 ENCOUNTER — HOSPITAL ENCOUNTER (OUTPATIENT)
Dept: PHYSICAL THERAPY | Facility: HOSPITAL | Age: 34
Setting detail: RECURRING SERIES
Discharge: HOME | End: 2024-03-07
Attending: FAMILY MEDICINE
Payer: COMMERCIAL

## 2024-03-04 PROCEDURE — 97112 NEUROMUSCULAR REEDUCATION: CPT | Performed by: PHYSICAL THERAPIST

## 2024-03-04 PROCEDURE — 97110 THERAPEUTIC EXERCISES: CPT | Performed by: PHYSICAL THERAPIST

## 2024-03-04 NOTE — PROGRESS NOTES
PHYSICAL THERAPY - MEDICARE DAILY TREATMENT NOTE (updated 3/23)      Date: 3/4/2024          Patient Name:  Mian Asher :  1990   Medical   Diagnosis:  Posterior tibial tendinitis of both lower extremities [M76.821, M76.822]  Cramping of feet [R25.2] Treatment Diagnosis:  M79.671  RIGHT FOOT PAIN and M79.672  LEFT FOOT PAIN    Referral Source:  Dejon Lloyd MD Insurance:   Payor: ALLIED BENEFIT SYSTEM / Plan: ALLIED BENEFIT SYSTEM / Product Type: *No Product type* /                     Patient  verified yes     Visit #   Current  / Total 15 24   Time   In / Out 1058 1151   Total Treatment Time 53   Total Timed Codes 53   1:1 Treatment Time 53      [facility] Totals Reminder:  bill using total billable   min of TIMED therapeutic procedures and modalities.   8-22 min = 1 unit; 23-37 min = 2 units; 38-52 min = 3 units; 53-67 min = 4 units; 68-82 min = 5 units            SUBJECTIVE    Pain Level (0-10 scale): 1/10    Any medication changes, allergies to medications, adverse drug reactions, diagnosis change, or new procedure performed?: [x] No    [] Yes (see summary sheet for update)  Medications: Verified on Patient Summary List    Subjective functional status/changes:     Pt reports overall good tolerance with her foot.    OBJECTIVE      Therapeutic Procedures:  Tx Min Billable or 1:1 Min (if diff from Tx Min) Procedure, Rationale, Specifics   25 25 93608 Therapeutic Exercise (timed):  increase ROM, strength, coordination, balance, and proprioception to improve patient's ability to progress to PLOF and address remaining functional goals. (see flow sheet as applicable)     Details if applicable:     28  16429 Neuromuscular Re-Education (timed):  improve balance, coordination, kinesthetic sense, posture, core stability and proprioception to improve patient's ability to develop conscious control of individual muscles and awareness of position of extremities in order to progress to PLOF and address remaining

## 2024-03-06 ENCOUNTER — HOSPITAL ENCOUNTER (OUTPATIENT)
Dept: PHYSICAL THERAPY | Facility: HOSPITAL | Age: 34
Setting detail: RECURRING SERIES
Discharge: HOME | End: 2024-03-09
Attending: FAMILY MEDICINE
Payer: COMMERCIAL

## 2024-03-06 PROCEDURE — 97112 NEUROMUSCULAR REEDUCATION: CPT | Performed by: PHYSICAL THERAPIST

## 2024-03-06 PROCEDURE — 97016 VASOPNEUMATIC DEVICE THERAPY: CPT | Performed by: PHYSICAL THERAPIST

## 2024-03-06 PROCEDURE — 97110 THERAPEUTIC EXERCISES: CPT | Performed by: PHYSICAL THERAPIST

## 2024-03-06 NOTE — PROGRESS NOTES
flow sheet as applicable)    Details if applicable: heel downs with valgus pull from therapist, bird dogs, bridges on SWB, dead bugs holds (UE and LE ext) with SWB, knee anti-rotation position balance,      50 40    Total Total      Modalities Rationale:     decrease inflammation and decrease pain to improve patient's ability to progress to PLOF and address remaining functional goals.       min [] Estim Unattended,             type/location:       []  w/ice    []  w/heat        min [] Estim Attended,             type/location:       []  w/ice   []  w/heat         []  w/US   []  TENS insruct            min []  Mechanical Traction,        type/lbs:        []  pro      []  sup           []  int       []  cont            []  before manual           []  after manual     min []  Ultrasound,         settings/location:      min  unbilled []  Ice     []  Heat            location/position:    10     min [x]  Vasopneumatic Device,      press/temp: 34/med   pre-treatment girth :    post-treatment girth :    measured at (landmark       location) :  R boot   If using vaso (only need to measure limb vaso being performed on)        min []  Other:        Skin assessment post-treatment (if applicable):    [x]  intact    []  redness- no adverse reaction                 []redness - adverse reaction:               [x]  Patient Education billed concurrently with other procedures   [x] Review HEP    [] Progressed/Changed HEP, detail:    [] Other detail:         Other Objective/Functional Measures  --    Pain Level at end of session (0-10 scale): \"numb\" 0/10       Assessment   Pt tolerated session well. Progressed SLS with isometric hold for glut med with rotational pull from theraband. Improvement in bird dogs and dead bugs observed, though still fatiguing quickly. Still prone to load into posterior tibialis with core fatigue in standing. Plan to perform L hip mobilization at next treatment session.   Patient will continue to benefit from

## 2024-03-15 ENCOUNTER — HOSPITAL ENCOUNTER (OUTPATIENT)
Dept: PHYSICAL THERAPY | Facility: HOSPITAL | Age: 34
Setting detail: RECURRING SERIES
Discharge: HOME | End: 2024-03-18
Attending: FAMILY MEDICINE
Payer: COMMERCIAL

## 2024-03-15 ENCOUNTER — OFFICE VISIT (OUTPATIENT)
Facility: CLINIC | Age: 34
End: 2024-03-15
Payer: COMMERCIAL

## 2024-03-15 VITALS
OXYGEN SATURATION: 96 % | HEART RATE: 63 BPM | SYSTOLIC BLOOD PRESSURE: 118 MMHG | BODY MASS INDEX: 28.44 KG/M2 | WEIGHT: 192 LBS | TEMPERATURE: 98.1 F | RESPIRATION RATE: 16 BRPM | HEIGHT: 69 IN | DIASTOLIC BLOOD PRESSURE: 79 MMHG

## 2024-03-15 DIAGNOSIS — E78.00 ELEVATED CHOLESTEROL: Primary | ICD-10-CM

## 2024-03-15 PROCEDURE — 97110 THERAPEUTIC EXERCISES: CPT | Performed by: PHYSICAL THERAPIST

## 2024-03-15 PROCEDURE — 97140 MANUAL THERAPY 1/> REGIONS: CPT | Performed by: PHYSICAL THERAPIST

## 2024-03-15 PROCEDURE — 99213 OFFICE O/P EST LOW 20 MIN: CPT | Performed by: FAMILY MEDICINE

## 2024-03-15 ASSESSMENT — PATIENT HEALTH QUESTIONNAIRE - PHQ9
10. IF YOU CHECKED OFF ANY PROBLEMS, HOW DIFFICULT HAVE THESE PROBLEMS MADE IT FOR YOU TO DO YOUR WORK, TAKE CARE OF THINGS AT HOME, OR GET ALONG WITH OTHER PEOPLE: 0
5. POOR APPETITE OR OVEREATING: 0
8. MOVING OR SPEAKING SO SLOWLY THAT OTHER PEOPLE COULD HAVE NOTICED. OR THE OPPOSITE, BEING SO FIGETY OR RESTLESS THAT YOU HAVE BEEN MOVING AROUND A LOT MORE THAN USUAL: 0
SUM OF ALL RESPONSES TO PHQ QUESTIONS 1-9: 0
7. TROUBLE CONCENTRATING ON THINGS, SUCH AS READING THE NEWSPAPER OR WATCHING TELEVISION: 0
SUM OF ALL RESPONSES TO PHQ QUESTIONS 1-9: 0
9. THOUGHTS THAT YOU WOULD BE BETTER OFF DEAD, OR OF HURTING YOURSELF: 0
3. TROUBLE FALLING OR STAYING ASLEEP: 0
4. FEELING TIRED OR HAVING LITTLE ENERGY: 0
SUM OF ALL RESPONSES TO PHQ QUESTIONS 1-9: 0
SUM OF ALL RESPONSES TO PHQ9 QUESTIONS 1 & 2: 0
1. LITTLE INTEREST OR PLEASURE IN DOING THINGS: 0
6. FEELING BAD ABOUT YOURSELF - OR THAT YOU ARE A FAILURE OR HAVE LET YOURSELF OR YOUR FAMILY DOWN: 0
2. FEELING DOWN, DEPRESSED OR HOPELESS: 0
SUM OF ALL RESPONSES TO PHQ QUESTIONS 1-9: 0

## 2024-03-15 NOTE — PROGRESS NOTES
Chief Complaint   Patient presents with    Cholesterol Problem     Patient is here for a follow up.      she is a 33 y.o. year old female who presents for follow-up of   hyperlipidemia.  Cardiovascular risk analysis - LDL goal is under 130.     Compliance with treatment thus far has been fair.     New concerns: None.     Social History, Diet and Lifestyle: generally follows a low fat low cholesterol diet, exercises regularly          ROS: taking medications as instructed, no medication side effects noted, no TIA's, no chest pain on exertion, no dyspnea on exertion, no swelling of ankles.   Reviewed and agree with Nurse Note and duplicated in this note.  Reviewed PmHx, RxHx, FmHx, SocHx, AllgHx and updated and dated in the chart.    Family History   Adopted: Yes       Past Medical History:   Diagnosis Date    Encounter for insertion of copper IUD     Placed in 2017    Encounter for IUD removal 01/26/2023    Epilepsy (HCC)     Mental disorder     Anxiety and Depression    Pre-eclampsia in third trimester 10/27/2023      Social History     Socioeconomic History    Marital status:    Tobacco Use    Smoking status: Former    Smokeless tobacco: Never   Substance and Sexual Activity    Alcohol use: Not Currently    Drug use: Not Currently     Types: Marijuana (Weed)    Sexual activity: Yes     Partners: Male     Social Determinants of Health     Intimate Partner Violence: Not At Risk (11/9/2022)    Humiliation, Afraid, Rape, and Kick questionnaire     Fear of Current or Ex-Partner: No     Emotionally Abused: No     Physically Abused: No     Sexually Abused: No      Patient Active Problem List   Diagnosis    Encounter for supervision of normal first pregnancy in first trimester    Encounter for elective induction of labor    Pre-eclampsia in third trimester     Patient Active Problem List    Diagnosis Date Noted    Pre-eclampsia in third trimester 10/27/2023    Encounter for elective induction of labor 10/25/2023

## 2024-03-15 NOTE — PROGRESS NOTES
PHYSICAL THERAPY - MEDICARE DAILY TREATMENT NOTE (updated 3/23)      Date: 3/15/2024          Patient Name:  Mian Asher :  1990   Medical   Diagnosis:  Posterior tibial tendinitis of both lower extremities [M76.821, M76.822]  Cramping of feet [R25.2] Treatment Diagnosis:  M79.671  RIGHT FOOT PAIN and M79.672  LEFT FOOT PAIN    Referral Source:  Dejon Lloyd MD Insurance:   Payor: BCBS / Plan: BCBS OUT OF STATE / Product Type: *No Product type* /                     Patient  verified yes     Visit #   Current  / Total 16 24   Time   In / Out 0846 0930   Total Treatment Time 44   Total Timed Codes 44   1:1 Treatment Time 39      [facility] Totals Reminder:  bill using total billable   min of TIMED therapeutic procedures and modalities.   8-22 min = 1 unit; 23-37 min = 2 units; 38-52 min = 3 units; 53-67 min = 4 units; 68-82 min = 5 units            SUBJECTIVE    Pain Level (0-10 scale): 3/10    Any medication changes, allergies to medications, adverse drug reactions, diagnosis change, or new procedure performed?: [x] No    [] Yes (see summary sheet for update)  Medications: Verified on Patient Summary List    Subjective functional status/changes:     Pt reports having to do more work at her parents house in the [facility] last week and that seemed to lead to increased achiness in both of her feet.      OBJECTIVE      Therapeutic Procedures:  Tx Min Billable or 1:1 Min (if diff from Tx Min) Procedure, Rationale, Specifics    24201 Therapeutic Exercise (timed):  increase ROM, strength, coordination, balance, and proprioception to improve patient's ability to progress to PLOF and address remaining functional goals. (see flow sheet as applicable)     Details if applicable:      04278 Manual Therapy (timed):  decrease pain, increase ROM, increase tissue extensibility, and decrease trigger points to improve patient's ability to progress to PLOF and address remaining functional goals.  The manual therapy

## 2024-03-16 LAB
CHOLEST SERPL-MCNC: 260 MG/DL
HDLC SERPL-MCNC: 52 MG/DL
HDLC SERPL: 5 (ref 0–5)
LDLC SERPL CALC-MCNC: 190 MG/DL (ref 0–100)
TRIGL SERPL-MCNC: 90 MG/DL
VLDLC SERPL CALC-MCNC: 18 MG/DL

## 2024-03-18 ENCOUNTER — HOSPITAL ENCOUNTER (OUTPATIENT)
Dept: PHYSICAL THERAPY | Facility: HOSPITAL | Age: 34
Setting detail: RECURRING SERIES
Discharge: HOME | End: 2024-03-21
Attending: FAMILY MEDICINE
Payer: COMMERCIAL

## 2024-03-18 PROCEDURE — 97140 MANUAL THERAPY 1/> REGIONS: CPT | Performed by: PHYSICAL THERAPIST

## 2024-03-18 PROCEDURE — 97110 THERAPEUTIC EXERCISES: CPT | Performed by: PHYSICAL THERAPIST

## 2024-03-18 PROCEDURE — 97016 VASOPNEUMATIC DEVICE THERAPY: CPT | Performed by: PHYSICAL THERAPIST

## 2024-03-18 PROCEDURE — 97112 NEUROMUSCULAR REEDUCATION: CPT | Performed by: PHYSICAL THERAPIST

## 2024-03-18 RX ORDER — ATORVASTATIN CALCIUM 20 MG/1
20 TABLET, FILM COATED ORAL DAILY
Qty: 90 TABLET | Refills: 0 | Status: SHIPPED | OUTPATIENT
Start: 2024-03-18

## 2024-03-18 NOTE — PROGRESS NOTES
Physical Therapy at [facility],   a part of [address], [address]  [place] [address]  Phone: [phone]  Fax: [phone]      PHYSICAL THERAPY PROGRESS NOTE  Patient Name:  Mian Asher :  1990   Treatment/Medical Diagnosis: Posterior tibial tendinitis of both lower extremities [M76.821, M76.822]  Cramping of feet [R25.2]   Referral Source:  Dejon Lloyd MD     Date of Initial Visit:  24 Attended Visits:  17 Missed Visits:  1     SUMMARY OF TREATMENT/ASSESSMENT:  Patient is making slow but good progress with skilled physical therapy. She endorses being \"almost back to normal\" with her left foot/lower leg pain, but feels that her RLE has flared up over the past two weeks, but is still better than when she started. She demonstrates continued improvement in her FOTO score as well. She continues to demonstrate impairments with core and hip strength,balance, and turgor along B gastrocs, peroneals, and plantar fascia. She will continue to benefit from skilled physical therapy to maximize outcomes and return to PLOF.    CURRENT STATUS/GOALS:  FOTO score: 59/100     Short Term Goals: To be accomplished in 6-8 treatments.  1. The Pt will be independent and compliant with their HEP- progressing  2. The Pt will report a 50% reduction in their pain with ADLs- progressing     Long Term Goals: To be accomplished in 20-24 treatments.  The Pt will score the MCII on her FOTO survey demonstrating improved overall function (47 to 59 points)- progressing  The Pt will be able to stand >/= 45 minutes with 0-2/10 pain to allow the Pt to be able to perform standing ADLs with less pain or discomfort- progressing  The Pt will be able to walk >/= 30 minutes with 0-2/10 pain to allow the Pt to be able to walk for recreation- progressing  The Pt will have >/= 10 degrees of active dorsiflexion to allow the Pt to be able to walk without compensation- progressing

## 2024-03-18 NOTE — PROGRESS NOTES
PHYSICAL THERAPY - MEDICARE DAILY TREATMENT NOTE (updated 3/23)      Date: 3/18/2024          Patient Name:  Mian Asher :  1990   Medical   Diagnosis:  Posterior tibial tendinitis of both lower extremities [M76.821, M76.822]  Cramping of feet [R25.2] Treatment Diagnosis:  M79.671  RIGHT FOOT PAIN and M79.672  LEFT FOOT PAIN    Referral Source:  Dejon Lloyd MD Insurance:   Payor: BCBS / Plan: BCBS OUT OF STATE / Product Type: *No Product type* /                     Patient  verified yes     Visit #   Current  / Total 17 24   Time   In / Out 1330 1437   Total Treatment Time 67   Total Timed Codes 57   1:1 Treatment Time 57      [facility] Totals Reminder:  bill using total billable   min of TIMED therapeutic procedures and modalities.   8-22 min = 1 unit; 23-37 min = 2 units; 38-52 min = 3 units; 53-67 min = 4 units; 68-82 min = 5 units            SUBJECTIVE    Pain Level (0-10 scale): 1/10 LLE, 3-4/10 RLE    Any medication changes, allergies to medications, adverse drug reactions, diagnosis change, or new procedure performed?: [x] No    [] Yes (see summary sheet for update)  Medications: Verified on Patient Summary List    Subjective functional status/changes:     Pt reports having to hobble for a few steps after sitting for a while due to RLE pain.      OBJECTIVE      Therapeutic Procedures:  Tx Min Billable or 1:1 Min (if diff from Tx Min) Procedure, Rationale, Specifics    22 08122 Neuromuscular Re-Education (timed):  improve balance, coordination, kinesthetic sense, posture, core stability and proprioception to improve patient's ability to develop conscious control of individual muscles and awareness of position of extremities in order to progress to PLOF and address remaining functional goals. (see flow sheet as applicable)    Details if applicable:  BAPS board in sitting and standing,      10 10 45874 Therapeutic Exercise (timed):  increase ROM, strength, coordination, balance, and proprioception

## 2024-03-21 ENCOUNTER — HOSPITAL ENCOUNTER (OUTPATIENT)
Dept: PHYSICAL THERAPY | Facility: HOSPITAL | Age: 34
Setting detail: RECURRING SERIES
Discharge: HOME | End: 2024-03-24
Attending: FAMILY MEDICINE
Payer: COMMERCIAL

## 2024-03-21 PROCEDURE — 97110 THERAPEUTIC EXERCISES: CPT | Performed by: PHYSICAL THERAPIST

## 2024-03-21 PROCEDURE — 97016 VASOPNEUMATIC DEVICE THERAPY: CPT | Performed by: PHYSICAL THERAPIST

## 2024-03-21 PROCEDURE — 97140 MANUAL THERAPY 1/> REGIONS: CPT | Performed by: PHYSICAL THERAPIST

## 2024-03-21 PROCEDURE — 97112 NEUROMUSCULAR REEDUCATION: CPT | Performed by: PHYSICAL THERAPIST

## 2024-03-21 NOTE — PROGRESS NOTES
PHYSICAL THERAPY - MEDICARE DAILY TREATMENT NOTE (updated 3/23)      Date: 3/21/2024          Patient Name:  Mian Asher :  1990   Medical   Diagnosis:  Posterior tibial tendinitis of both lower extremities [M76.821, M76.822]  Cramping of feet [R25.2] Treatment Diagnosis:  M79.671  RIGHT FOOT PAIN and M79.672  LEFT FOOT PAIN    Referral Source:  Dejon Lloyd MD Insurance:   Payor: BCBS / Plan: BCBS OUT OF STATE / Product Type: *No Product type* /                     Patient  verified yes     Visit #   Current  / Total 18 24   Time   In / Out 0830 0952   Total Treatment Time 82   Total Timed Codes 72   1:1 Treatment Time 68      [facility] Totals Reminder:  bill using total billable   min of TIMED therapeutic procedures and modalities.   8-22 min = 1 unit; 23-37 min = 2 units; 38-52 min = 3 units; 53-67 min = 4 units; 68-82 min = 5 units            SUBJECTIVE    Pain Level (0-10 scale): 2/10 LLE, 2/10 RLE    Any medication changes, allergies to medications, adverse drug reactions, diagnosis change, or new procedure performed?: [x] No    [] Yes (see summary sheet for update)  Medications: Verified on Patient Summary List    Subjective functional status/changes:     Pt reports overall improvement in her foot pain compared to last time.       OBJECTIVE      Therapeutic Procedures:  Tx Min Billable or 1:1 Min (if diff from Tx Min) Procedure, Rationale, Specifics   25 25 22689 Neuromuscular Re-Education (timed):  improve balance, coordination, kinesthetic sense, posture, core stability and proprioception to improve patient's ability to develop conscious control of individual muscles and awareness of position of extremities in order to progress to PLOF and address remaining functional goals. (see flow sheet as applicable)    Details if applicable:  BAPS board in standing, glut med isometric hold with SWB, TA + shoulder ext,     22 18 66603 Therapeutic Exercise (timed):  increase ROM, strength, coordination,

## 2024-03-25 ENCOUNTER — HOSPITAL ENCOUNTER (OUTPATIENT)
Dept: PHYSICAL THERAPY | Facility: HOSPITAL | Age: 34
Setting detail: RECURRING SERIES
Discharge: HOME | End: 2024-03-28
Attending: FAMILY MEDICINE
Payer: COMMERCIAL

## 2024-03-25 PROCEDURE — 97110 THERAPEUTIC EXERCISES: CPT | Performed by: PHYSICAL THERAPIST

## 2024-03-25 PROCEDURE — 97140 MANUAL THERAPY 1/> REGIONS: CPT | Performed by: PHYSICAL THERAPIST

## 2024-03-25 PROCEDURE — 97016 VASOPNEUMATIC DEVICE THERAPY: CPT | Performed by: PHYSICAL THERAPIST

## 2024-03-25 PROCEDURE — 97112 NEUROMUSCULAR REEDUCATION: CPT | Performed by: PHYSICAL THERAPIST

## 2024-03-25 NOTE — PROGRESS NOTES
PHYSICAL THERAPY - MEDICARE DAILY TREATMENT NOTE (updated 3/23)      Date: 3/25/2024          Patient Name:  Mian Asher :  1990   Medical   Diagnosis:  Posterior tibial tendinitis of both lower extremities [M76.821, M76.822]  Cramping of feet [R25.2] Treatment Diagnosis:  M79.671  RIGHT FOOT PAIN and M79.672  LEFT FOOT PAIN    Referral Source:  Dejon Lloyd MD Insurance:   Payor: BCBS / Plan: BCBS OUT OF STATE / Product Type: *No Product type* /                     Patient  verified yes     Visit #   Current  / Total 20 24   Time   In / Out 0830 0950   Total Treatment Time 80   Total Timed Codes 65   1:1 Treatment Time 60      [facility] Totals Reminder:  bill using total billable   min of TIMED therapeutic procedures and modalities.   8-22 min = 1 unit; 23-37 min = 2 units; 38-52 min = 3 units; 53-67 min = 4 units; 68-82 min = 5 units            SUBJECTIVE    Pain Level (0-10 scale): 1/10 RLE    Any medication changes, allergies to medications, adverse drug reactions, diagnosis change, or new procedure performed?: [x] No    [] Yes (see summary sheet for update)  Medications: Verified on Patient Summary List    Subjective functional status/changes:     Pt reports overall improvement in her foot pain compared to last time.       OBJECTIVE      Therapeutic Procedures:  Tx Min Billable or 1:1 Min (if diff from Tx Min) Procedure, Rationale, Specifics   20 15 12551 Neuromuscular Re-Education (timed):  improve balance, coordination, kinesthetic sense, posture, core stability and proprioception to improve patient's ability to develop conscious control of individual muscles and awareness of position of extremities in order to progress to PLOF and address remaining functional goals. (see flow sheet as applicable)    Details if applicable:  glut med isometric hold with SWB, SL core press,      20 20 81926 Therapeutic Exercise (timed):  increase ROM, strength, coordination, balance, and proprioception to improve

## 2024-03-27 ENCOUNTER — HOSPITAL ENCOUNTER (OUTPATIENT)
Dept: PHYSICAL THERAPY | Facility: HOSPITAL | Age: 34
Setting detail: RECURRING SERIES
Discharge: HOME | End: 2024-03-30
Attending: FAMILY MEDICINE
Payer: COMMERCIAL

## 2024-03-27 PROCEDURE — 97112 NEUROMUSCULAR REEDUCATION: CPT | Performed by: PHYSICAL THERAPIST

## 2024-03-27 PROCEDURE — 97110 THERAPEUTIC EXERCISES: CPT | Performed by: PHYSICAL THERAPIST

## 2024-03-27 PROCEDURE — 97140 MANUAL THERAPY 1/> REGIONS: CPT | Performed by: PHYSICAL THERAPIST

## 2024-03-27 NOTE — PROGRESS NOTES
PHYSICAL THERAPY - MEDICARE DAILY TREATMENT NOTE (updated 3/23)      Date: 3/27/2024          Patient Name:  Mian Asher :  1990   Medical   Diagnosis:  Posterior tibial tendinitis of both lower extremities [M76.821, M76.822]  Cramping of feet [R25.2] Treatment Diagnosis:  M79.671  RIGHT FOOT PAIN and M79.672  LEFT FOOT PAIN    Referral Source:  Dejon Lloyd MD Insurance:   Payor: BCBS / Plan: BCBS OUT OF STATE / Product Type: *No Product type* /                     Patient  verified yes     Visit #   Current  / Total 21 24   Time   In / Out 0830 0930   Total Treatment Time 60   Total Timed Codes 60   1:1 Treatment Time 55      [facility] Totals Reminder:  bill using total billable   min of TIMED therapeutic procedures and modalities.   8-22 min = 1 unit; 23-37 min = 2 units; 38-52 min = 3 units; 53-67 min = 4 units; 68-82 min = 5 units            SUBJECTIVE    Pain Level (0-10 scale): 1/10 RLE    Any medication changes, allergies to medications, adverse drug reactions, diagnosis change, or new procedure performed?: [x] No    [] Yes (see summary sheet for update)  Medications: Verified on Patient Summary List    Subjective functional status/changes:     Pt reports that she was able to use the spiky ball on her lateral gastroc head.       OBJECTIVE      Therapeutic Procedures:  Tx Min Billable or 1:1 Min (if diff from Tx Min) Procedure, Rationale, Specifics   15 15 89171 Neuromuscular Re-Education (timed):  improve balance, coordination, kinesthetic sense, posture, core stability and proprioception to improve patient's ability to develop conscious control of individual muscles and awareness of position of extremities in order to progress to PLOF and address remaining functional goals. (see flow sheet as applicable)    Details if applicable: HS curl with tactile cues, eccentric HS     20 15 60456 Therapeutic Exercise (timed):  increase ROM, strength, coordination, balance, and proprioception to improve

## 2024-04-04 ENCOUNTER — HOSPITAL ENCOUNTER (OUTPATIENT)
Dept: PHYSICAL THERAPY | Facility: HOSPITAL | Age: 34
Setting detail: RECURRING SERIES
Discharge: HOME | End: 2024-04-07
Attending: FAMILY MEDICINE
Payer: COMMERCIAL

## 2024-04-04 PROCEDURE — 97016 VASOPNEUMATIC DEVICE THERAPY: CPT | Performed by: PHYSICAL THERAPIST

## 2024-04-04 PROCEDURE — 97110 THERAPEUTIC EXERCISES: CPT | Performed by: PHYSICAL THERAPIST

## 2024-04-04 PROCEDURE — 97112 NEUROMUSCULAR REEDUCATION: CPT | Performed by: PHYSICAL THERAPIST

## 2024-04-04 PROCEDURE — 97140 MANUAL THERAPY 1/> REGIONS: CPT | Performed by: PHYSICAL THERAPIST

## 2024-04-04 NOTE — PROGRESS NOTES
ROM, strength, coordination, balance, and proprioception to improve patient's ability to progress to PLOF and address remaining functional goals. (see flow sheet as applicable)     Details if applicable:     25 34 10414 Manual Therapy (timed):  decrease pain, increase ROM, increase tissue extensibility, and decrease trigger points to improve patient's ability to progress to PLOF and address remaining functional goals.  The manual therapy interventions were performed at a separate and distinct time from the therapeutic activities interventions . (see flow sheet as applicable)    Details if applicable:  STM/MFR to B gastrocs, anterior tibialis, peroneals, B hamstrings (L > R)      78 68    Total Total      Modalities Rationale:     decrease inflammation and decrease pain to improve patient's ability to progress to PLOF and address remaining functional goals.       min [] Estim Unattended,             type/location:       []  w/ice    []  w/heat        min [] Estim Attended,             type/location:       []  w/ice   []  w/heat         []  w/US   []  TENS insruct            min []  Mechanical Traction,        type/lbs:        []  pro      []  sup           []  int       []  cont            []  before manual           []  after manual     min []  Ultrasound,         settings/location:      min  unbilled []  Ice     []  Heat            location/position:    10     min [x]  Vasopneumatic Device,      press/temp: 34/high   pre-treatment girth :    post-treatment girth :    measured at (landmark       location) : B ankles (R boot)  If using vaso (only need to measure limb vaso being performed on)        min []  Other:        Skin assessment post-treatment (if applicable):    [x]  intact    []  redness- no adverse reaction                 []redness - adverse reaction:               [x]  Patient Education billed concurrently with other procedures   [x] Review HEP    [] Progressed/Changed HEP, detail:    [] Other detail:

## 2024-04-10 ENCOUNTER — HOSPITAL ENCOUNTER (OUTPATIENT)
Dept: PHYSICAL THERAPY | Facility: HOSPITAL | Age: 34
Setting detail: RECURRING SERIES
Discharge: HOME | End: 2024-04-13
Attending: FAMILY MEDICINE
Payer: COMMERCIAL

## 2024-04-10 PROCEDURE — 97112 NEUROMUSCULAR REEDUCATION: CPT | Performed by: PHYSICAL THERAPIST

## 2024-04-10 PROCEDURE — 97140 MANUAL THERAPY 1/> REGIONS: CPT | Performed by: PHYSICAL THERAPIST

## 2024-04-10 PROCEDURE — 97110 THERAPEUTIC EXERCISES: CPT | Performed by: PHYSICAL THERAPIST

## 2024-04-10 NOTE — PROGRESS NOTES
PHYSICAL THERAPY - MEDICARE DAILY TREATMENT NOTE (updated 3/23)      Date: 4/10/2024          Patient Name:  Mian Asher :  1990   Medical   Diagnosis:  Posterior tibial tendinitis of both lower extremities [M76.821, M76.822]  Cramping of feet [R25.2] Treatment Diagnosis:  M79.671  RIGHT FOOT PAIN and M79.672  LEFT FOOT PAIN    Referral Source:  Dejon Lloyd MD Insurance:   Payor: BCBS / Plan: BCBS OUT OF STATE / Product Type: *No Product type* /                     Patient  verified yes     Visit #   Current  / Total 24 24   Time   In / Out 0830 0928   Total Treatment Time 58   Total Timed Codes 58   1:1 Treatment Time 53      [facility] Totals Reminder:  bill using total billable   min of TIMED therapeutic procedures and modalities.   8-22 min = 1 unit; 23-37 min = 2 units; 38-52 min = 3 units; 53-67 min = 4 units; 68-82 min = 5 units            SUBJECTIVE    Pain Level (0-10 scale): 1/10 RLE    Any medication changes, allergies to medications, adverse drug reactions, diagnosis change, or new procedure performed?: [x] No    [] Yes (see summary sheet for update)  Medications: Verified on Patient Summary List    Subjective functional status/changes:     Pt reports that she got oofos and that she has noticed an improvement in her pain at home.       OBJECTIVE      Therapeutic Procedures:  Tx Min Billable or 1:1 Min (if diff from Tx Min) Procedure, Rationale, Specifics   17 17 77025 Neuromuscular Re-Education (timed):  improve balance, coordination, kinesthetic sense, posture, core stability and proprioception to improve patient's ability to develop conscious control of individual muscles and awareness of position of extremities in order to progress to PLOF and address remaining functional goals. (see flow sheet as applicable)    Details if applicable: core press, BAPS board in standing, step ups with mirror for feedback     28 23 24225 Therapeutic Exercise (timed):  increase ROM, strength, coordination,

## 2024-04-12 ENCOUNTER — HOSPITAL ENCOUNTER (OUTPATIENT)
Dept: PHYSICAL THERAPY | Facility: HOSPITAL | Age: 34
Setting detail: RECURRING SERIES
Discharge: HOME | End: 2024-04-15
Attending: FAMILY MEDICINE
Payer: COMMERCIAL

## 2024-04-12 PROCEDURE — 97016 VASOPNEUMATIC DEVICE THERAPY: CPT | Performed by: PHYSICAL THERAPIST

## 2024-04-12 PROCEDURE — 97110 THERAPEUTIC EXERCISES: CPT | Performed by: PHYSICAL THERAPIST

## 2024-04-12 PROCEDURE — 97112 NEUROMUSCULAR REEDUCATION: CPT | Performed by: PHYSICAL THERAPIST

## 2024-04-12 NOTE — PROGRESS NOTES
PHYSICAL THERAPY - MEDICARE DAILY TREATMENT NOTE (updated 3/23)      Date: 2024          Patient Name:  Mian Asher :  1990   Medical   Diagnosis:  Posterior tibial tendinitis of both lower extremities [M76.821, M76.822]  Cramping of feet [R25.2] Treatment Diagnosis:  M79.671  RIGHT FOOT PAIN and M79.672  LEFT FOOT PAIN    Referral Source:  Dejon Lloyd MD Insurance:   Payor: BCBS / Plan: BCBS OUT OF STATE / Product Type: *No Product type* /                     Patient  verified yes     Visit #   Current  / Total 24 24   Time   In / Out 0918 1024   Total Treatment Time 69   Total Timed Codes 54   1:1 Treatment Time 54      [facility] Totals Reminder:  bill using total billable   min of TIMED therapeutic procedures and modalities.   8-22 min = 1 unit; 23-37 min = 2 units; 38-52 min = 3 units; 53-67 min = 4 units; 68-82 min = 5 units            SUBJECTIVE    Pain Level (0-10 scale): 1/10 RLE    Any medication changes, allergies to medications, adverse drug reactions, diagnosis change, or new procedure performed?: [x] No    [] Yes (see summary sheet for update)  Medications: Verified on Patient Summary List    Subjective functional status/changes:     Pt reports that she can feel her feet today but they aren't too bad.       OBJECTIVE      Therapeutic Procedures:  Tx Min Billable or 1:1 Min (if diff from Tx Min) Procedure, Rationale, Specifics   30 30 51116 Neuromuscular Re-Education (timed):  improve balance, coordination, kinesthetic sense, posture, core stability and proprioception to improve patient's ability to develop conscious control of individual muscles and awareness of position of extremities in order to progress to PLOF and address remaining functional goals. (see flow sheet as applicable)    Details if applicable: core press, BAPS board in standing, step ups with mirror for feedback     24 24 58628 Therapeutic Exercise (timed):  increase ROM, strength, coordination, balance, and

## 2024-04-15 ENCOUNTER — HOSPITAL ENCOUNTER (OUTPATIENT)
Dept: PHYSICAL THERAPY | Facility: HOSPITAL | Age: 34
Setting detail: RECURRING SERIES
Discharge: HOME | End: 2024-04-18
Attending: FAMILY MEDICINE
Payer: COMMERCIAL

## 2024-04-15 PROCEDURE — 97112 NEUROMUSCULAR REEDUCATION: CPT | Performed by: PHYSICAL THERAPIST

## 2024-04-15 PROCEDURE — 97140 MANUAL THERAPY 1/> REGIONS: CPT | Performed by: PHYSICAL THERAPIST

## 2024-04-15 PROCEDURE — 97110 THERAPEUTIC EXERCISES: CPT | Performed by: PHYSICAL THERAPIST

## 2024-04-15 PROCEDURE — 97016 VASOPNEUMATIC DEVICE THERAPY: CPT | Performed by: PHYSICAL THERAPIST

## 2024-04-15 NOTE — PROGRESS NOTES
and address remaining functional goals. (see flow sheet as applicable)     Details if applicable:     23 23 64190 Manual Therapy (timed):  decrease pain, increase ROM, increase tissue extensibility, and decrease trigger points to improve patient's ability to progress to PLOF and address remaining functional goals.  The manual therapy interventions were performed at a separate and distinct time from the therapeutic activities interventions . (see flow sheet as applicable)    Details if applicable:  STM/MFR to B hamstrings (R > L ), distal ITB, lateral quad, B gastroc heads,        60 55    Total Total      Modalities Rationale:     decrease inflammation and decrease pain to improve patient's ability to progress to PLOF and address remaining functional goals.       min [] Estim Unattended,             type/location:       []  w/ice    []  w/heat        min [] Estim Attended,             type/location:       []  w/ice   []  w/heat         []  w/US   []  TENS insruct            min []  Mechanical Traction,        type/lbs:        []  pro      []  sup           []  int       []  cont            []  before manual           []  after manual     min []  Ultrasound,         settings/location:      min  unbilled []  Ice     []  Heat            location/position:    13     min []  Vasopneumatic Device,      press/temp: 34/med   pre-treatment girth :    post-treatment girth :    measured at (landmark       location) : L boot  If using vaso (only need to measure limb vaso being performed on)        min []  Other:        Skin assessment post-treatment (if applicable):    [x]  intact    []  redness- no adverse reaction                 []redness - adverse reaction:                 [x]  Patient Education billed concurrently with other procedures   [x] Review HEP    [] Progressed/Changed HEP, detail:    [] Other detail:         Other Objective/Functional Measures       Pain Level at end of session (0-10 scale): 1/10 in RLE \"workout

## 2024-04-18 ENCOUNTER — HOSPITAL ENCOUNTER (OUTPATIENT)
Dept: PHYSICAL THERAPY | Facility: HOSPITAL | Age: 34
Setting detail: RECURRING SERIES
Discharge: HOME | End: 2024-04-21
Attending: FAMILY MEDICINE
Payer: COMMERCIAL

## 2024-04-18 PROCEDURE — G0283 ELEC STIM OTHER THAN WOUND: HCPCS | Performed by: PHYSICAL THERAPIST

## 2024-04-18 PROCEDURE — 97112 NEUROMUSCULAR REEDUCATION: CPT | Performed by: PHYSICAL THERAPIST

## 2024-04-18 PROCEDURE — 97016 VASOPNEUMATIC DEVICE THERAPY: CPT | Performed by: PHYSICAL THERAPIST

## 2024-04-18 PROCEDURE — 97140 MANUAL THERAPY 1/> REGIONS: CPT | Performed by: PHYSICAL THERAPIST

## 2024-04-18 PROCEDURE — 97110 THERAPEUTIC EXERCISES: CPT | Performed by: PHYSICAL THERAPIST

## 2024-04-18 NOTE — PROGRESS NOTES
PHYSICAL THERAPY - MEDICARE DAILY TREATMENT NOTE (updated 3/23)      Date: 2024          Patient Name:  Mian Asher :  1990   Medical   Diagnosis:  Posterior tibial tendinitis of both lower extremities [M76.821, M76.822]  Cramping of feet [R25.2] Treatment Diagnosis:  M79.671  RIGHT FOOT PAIN and M79.672  LEFT FOOT PAIN    Referral Source:  Dejon Lloyd MD Insurance:   Payor: BCBS / Plan: BCBS OUT OF STATE / Product Type: *No Product type* /                     Patient  verified yes     Visit #   Current  / Total 26 32   Time   In / Out 0830 0950   Total Treatment Time 80   Total Timed Codes 65   1:1 Treatment Time 55      [facility] Totals Reminder:  bill using total billable   min of TIMED therapeutic procedures and modalities.   8-22 min = 1 unit; 23-37 min = 2 units; 38-52 min = 3 units; 53-67 min = 4 units; 68-82 min = 5 units            SUBJECTIVE    Pain Level (0-10 scale): 1-2/10 RLE    Any medication changes, allergies to medications, adverse drug reactions, diagnosis change, or new procedure performed?: [x] No    [] Yes (see summary sheet for update)  Medications: Verified on Patient Summary List    Subjective functional status/changes:     Pt reports increased tightness over L hamstring when she woke up.       OBJECTIVE      Therapeutic Procedures:  Tx Min Billable or 1:1 Min (if diff from Tx Min) Procedure, Rationale, Specifics   25 25 70455 Neuromuscular Re-Education (timed):  improve balance, coordination, kinesthetic sense, posture, core stability and proprioception to improve patient's ability to develop conscious control of individual muscles and awareness of position of extremities in order to progress to PLOF and address remaining functional goals. (see flow sheet as applicable)    Details if applicable: eccentric HR, knee anti-rotation, TA + shoulder ext,      20 10 35857 Therapeutic Exercise (timed):  increase ROM, strength, coordination, balance, and proprioception to improve

## 2024-04-26 ENCOUNTER — HOSPITAL ENCOUNTER (OUTPATIENT)
Dept: PHYSICAL THERAPY | Facility: HOSPITAL | Age: 34
Setting detail: RECURRING SERIES
Discharge: HOME | End: 2024-04-29
Attending: FAMILY MEDICINE
Payer: COMMERCIAL

## 2024-04-26 PROCEDURE — 97016 VASOPNEUMATIC DEVICE THERAPY: CPT | Performed by: PHYSICAL THERAPIST

## 2024-04-26 PROCEDURE — 97110 THERAPEUTIC EXERCISES: CPT | Performed by: PHYSICAL THERAPIST

## 2024-04-26 PROCEDURE — 97140 MANUAL THERAPY 1/> REGIONS: CPT | Performed by: PHYSICAL THERAPIST

## 2024-04-26 PROCEDURE — 97112 NEUROMUSCULAR REEDUCATION: CPT | Performed by: PHYSICAL THERAPIST

## 2024-04-26 NOTE — PROGRESS NOTES
PHYSICAL THERAPY - MEDICARE DAILY TREATMENT NOTE (updated 3/23)      Date: 2024          Patient Name:  Mian Asher :  1990   Medical   Diagnosis:  Posterior tibial tendinitis of both lower extremities [M76.821, M76.822]  Cramping of feet [R25.2] Treatment Diagnosis:  M79.671  RIGHT FOOT PAIN and M79.672  LEFT FOOT PAIN    Referral Source:  Dejon Lloyd MD Insurance:   Payor: [employer] / Plan: [employer] BENEFIT SYSTEM / Product Type: *No Product type* /                     Patient  verified yes     Visit #   Current  / Total 27 32   Time   In / Out 1205 1307   Total Treatment Time 62   Total Timed Codes 52   1:1 Treatment Time 47      [facility] Totals Reminder:  bill using total billable   min of TIMED therapeutic procedures and modalities.   8-22 min = 1 unit; 23-37 min = 2 units; 38-52 min = 3 units; 53-67 min = 4 units; 68-82 min = 5 units            SUBJECTIVE    Pain Level (0-10 scale): 1/10 RLE    Any medication changes, allergies to medications, adverse drug reactions, diagnosis change, or new procedure performed?: [x] No    [] Yes (see summary sheet for update)  Medications: Verified on Patient Summary List    Subjective functional status/changes:     Pt reports that she went on a \"stroller hike\" Monday and Wednesday of this week and felt really good throughout, though has minimal sleep 2/2 to her young daughter. She was able to do all of her exercises and progressed herself with resistance and reports significant soreness this date.        OBJECTIVE      Therapeutic Procedures:  Tx Min Billable or 1:1 Min (if diff from Tx Min) Procedure, Rationale, Specifics   10 10 71951 Neuromuscular Re-Education (timed):  improve balance, coordination, kinesthetic sense, posture, core stability and proprioception to improve patient's ability to develop conscious control of individual muscles and awareness of position of extremities in order to progress to PLOF and address remaining functional

## 2024-04-30 ENCOUNTER — HOSPITAL ENCOUNTER (OUTPATIENT)
Dept: PHYSICAL THERAPY | Facility: HOSPITAL | Age: 34
Setting detail: RECURRING SERIES
Discharge: HOME | End: 2024-05-03
Attending: FAMILY MEDICINE
Payer: COMMERCIAL

## 2024-04-30 PROCEDURE — 97112 NEUROMUSCULAR REEDUCATION: CPT | Performed by: PHYSICAL THERAPIST

## 2024-04-30 PROCEDURE — 97140 MANUAL THERAPY 1/> REGIONS: CPT | Performed by: PHYSICAL THERAPIST

## 2024-04-30 PROCEDURE — 97110 THERAPEUTIC EXERCISES: CPT | Performed by: PHYSICAL THERAPIST

## 2024-04-30 NOTE — PROGRESS NOTES
PHYSICAL THERAPY - MEDICARE DAILY TREATMENT NOTE (updated 3/23)      Date: 2024          Patient Name:  Mian Asher :  1990   Medical   Diagnosis:  Posterior tibial tendinitis of both lower extremities [M76.821, M76.822]  Cramping of feet [R25.2] Treatment Diagnosis:  M79.671  RIGHT FOOT PAIN and M79.672  LEFT FOOT PAIN    Referral Source:  Dejon Lloyd MD Insurance:   Payor: [employer] / Plan: [employer] BENEFIT SYSTEM / Product Type: *No Product type* /                     Patient  verified yes     Visit #   Current  / Total 28 32   Time   In / Out 0751 0850   Total Treatment Time 59   Total Timed Codes 59   1:1 Treatment Time 54      [facility] Totals Reminder:  bill using total billable   min of TIMED therapeutic procedures and modalities.   8-22 min = 1 unit; 23-37 min = 2 units; 38-52 min = 3 units; 53-67 min = 4 units; 68-82 min = 5 units            SUBJECTIVE    Pain Level (0-10 scale): 2-3/10 RLE    Any medication changes, allergies to medications, adverse drug reactions, diagnosis change, or new procedure performed?: [x] No    [] Yes (see summary sheet for update)  Medications: Verified on Patient Summary List    Subjective functional status/changes:     Pt reports that she did her whole exercise program on  but has had her feet feel flared up since that time. .        OBJECTIVE      Therapeutic Procedures:  Tx Min Billable or 1:1 Min (if diff from Tx Min) Procedure, Rationale, Specifics   15 15 92046 Neuromuscular Re-Education (timed):  improve balance, coordination, kinesthetic sense, posture, core stability and proprioception to improve patient's ability to develop conscious control of individual muscles and awareness of position of extremities in order to progress to PLOF and address remaining functional goals. (see flow sheet as applicable)    Details if applicable: eccentric HR, resisted TA + knee ext, isometric holds with SWB for glut med, glut med with SWB against wall,

## 2024-05-02 ENCOUNTER — HOSPITAL ENCOUNTER (OUTPATIENT)
Dept: PHYSICAL THERAPY | Facility: HOSPITAL | Age: 34
Setting detail: RECURRING SERIES
Discharge: HOME | End: 2024-05-05
Attending: FAMILY MEDICINE
Payer: COMMERCIAL

## 2024-05-02 PROCEDURE — 97140 MANUAL THERAPY 1/> REGIONS: CPT | Performed by: PHYSICAL THERAPIST

## 2024-05-02 PROCEDURE — 97110 THERAPEUTIC EXERCISES: CPT | Performed by: PHYSICAL THERAPIST

## 2024-05-07 ENCOUNTER — HOSPITAL ENCOUNTER (OUTPATIENT)
Dept: PHYSICAL THERAPY | Facility: HOSPITAL | Age: 34
Setting detail: RECURRING SERIES
Discharge: HOME | End: 2024-05-10
Attending: FAMILY MEDICINE
Payer: COMMERCIAL

## 2024-05-07 PROCEDURE — 97140 MANUAL THERAPY 1/> REGIONS: CPT

## 2024-05-07 PROCEDURE — 97016 VASOPNEUMATIC DEVICE THERAPY: CPT

## 2024-05-07 PROCEDURE — 97110 THERAPEUTIC EXERCISES: CPT

## 2024-05-07 NOTE — PROGRESS NOTES
PHYSICAL THERAPY - MEDICARE DAILY TREATMENT NOTE (updated 3/23)      Date: 2024          Patient Name:  Mian Asher :  1990   Medical   Diagnosis:  Posterior tibial tendinitis of both lower extremities [M76.821, M76.822]  Cramping of feet [R25.2] Treatment Diagnosis:  M79.671  RIGHT FOOT PAIN and M79.672  LEFT FOOT PAIN    Referral Source:  Dejon Lloyd MD Insurance:   Payor: [employer] B[employer]EF[employer] SYSTEM / Plan: ALLIED BENEFIT SYSTEM / Product Type: *No Product type* /                     Patient  verified yes     Visit #   Current  / Total 29 32   Time   In / Out 7:45A 9:00A   Total Treatment Time 75   Total Timed Codes 60   1:1 Treatment Time 55      [facility] Totals Reminder:  bill using total billable   min of TIMED therapeutic procedures and modalities.   8-22 min = 1 unit; 23-37 min = 2 units; 38-52 min = 3 units; 53-67 min = 4 units; 68-82 min = 5 units            SUBJECTIVE    Pain Level (0-10 scale): 2/10 RLE    Any medication changes, allergies to medications, adverse drug reactions, diagnosis change, or new procedure performed?: [x] No    [] Yes (see summary sheet for update)  Medications: Verified on Patient Summary List    Subjective functional status/changes:     Pt reported more discomfort along lateral R Achilles and peroneals today. Surprised on how weak her hip flexors were from last session.    OBJECTIVE      Therapeutic Procedures:  Tx Min Billable or 1:1 Min (if diff from Tx Min) Procedure, Rationale, Specifics   37 [phone] [id number] Therapeutic Exercise (timed):  increase ROM, strength, coordination, balance, and proprioception to improve patient's ability to progress to PLOF and address remaining functional goals. (see flow sheet as applicable)     Details if applicable:      82990 Manual Therapy (timed):  decrease pain, increase ROM, increase tissue extensibility, and decrease trigger points to improve patient's ability to progress to PLOF and address remaining functional goals.  The

## 2024-05-09 ENCOUNTER — HOSPITAL ENCOUNTER (OUTPATIENT)
Dept: PHYSICAL THERAPY | Facility: HOSPITAL | Age: 34
Setting detail: RECURRING SERIES
Discharge: HOME | End: 2024-05-12
Attending: FAMILY MEDICINE
Payer: COMMERCIAL

## 2024-05-09 PROCEDURE — 97110 THERAPEUTIC EXERCISES: CPT | Performed by: PHYSICAL THERAPIST

## 2024-05-09 PROCEDURE — 97112 NEUROMUSCULAR REEDUCATION: CPT | Performed by: PHYSICAL THERAPIST

## 2024-05-09 NOTE — PROGRESS NOTES
PHYSICAL THERAPY - MEDICARE DAILY TREATMENT NOTE (updated 3/23)      Date: 2024          Patient Name:  Mian Asher :  1990   Medical   Diagnosis:  Posterior tibial tendinitis of both lower extremities [M76.821, M76.822]  Cramping of feet [R25.2] Treatment Diagnosis:  M79.671  RIGHT FOOT PAIN and M79.672  LEFT FOOT PAIN    Referral Source:  Dejon Lloyd MD Insurance:   Payor: ALLIED BENEFIT SYSTEM / Plan: ALLIED BENEFIT SYSTEM / Product Type: *No Product type* /                     Patient  verified yes     Visit #   Current  / Total 31 42   Time   In / Out 0843 0900   Total Treatment Time 54   Total Timed Codes 54   1:1 Treatment Time 54      [facility] Totals Reminder:  bill using total billable   min of TIMED therapeutic procedures and modalities.   8-22 min = 1 unit; 23-37 min = 2 units; 38-52 min = 3 units; 53-67 min = 4 units; 68-82 min = 5 units            SUBJECTIVE    Pain Level (0-10 scale): 1/10 RLE    Any medication changes, allergies to medications, adverse drug reactions, diagnosis change, or new procedure performed?: [x] No    [] Yes (see summary sheet for update)  Medications: Verified on Patient Summary List    Subjective functional status/changes:     Pt reports feeling achy yesterday but feels pretty good today.    OBJECTIVE      Therapeutic Procedures:  Tx Min Billable or 1:1 Min (if diff from Tx Min) Procedure, Rationale, Specifics   30 30 29976 Therapeutic Exercise (timed):  increase ROM, strength, coordination, balance, and proprioception to improve patient's ability to progress to PLOF and address remaining functional goals. (see flow sheet as applicable)     Details if applicable:      20260 Neuromuscular Re-Education (timed):  improve balance, coordination, kinesthetic sense, posture, core stability and proprioception to improve patient's ability to develop conscious control of individual muscles and awareness of position of extremities in order to progress to PLOF and

## 2024-05-15 ENCOUNTER — HOSPITAL ENCOUNTER (OUTPATIENT)
Dept: PHYSICAL THERAPY | Facility: HOSPITAL | Age: 34
Setting detail: RECURRING SERIES
Discharge: HOME | End: 2024-05-18
Attending: FAMILY MEDICINE
Payer: COMMERCIAL

## 2024-05-15 PROCEDURE — 97140 MANUAL THERAPY 1/> REGIONS: CPT

## 2024-05-15 PROCEDURE — 97016 VASOPNEUMATIC DEVICE THERAPY: CPT

## 2024-05-15 PROCEDURE — 97112 NEUROMUSCULAR REEDUCATION: CPT

## 2024-05-15 PROCEDURE — 97110 THERAPEUTIC EXERCISES: CPT

## 2024-05-15 NOTE — PROGRESS NOTES
PHYSICAL THERAPY - MEDICARE DAILY TREATMENT NOTE (updated 3/23)      Date: 5/15/2024          Patient Name:  Mian Asher :  1990   Medical   Diagnosis:  Posterior tibial tendinitis of both lower extremities [M76.821, M76.822]  Cramping of feet [R25.2] Treatment Diagnosis:  M79.671  RIGHT FOOT PAIN and M79.672  LEFT FOOT PAIN    Referral Source:  Dejon Lloyd MD Insurance:   Payor: [employer]IED BENEFIT SYSTEM / Plan: ALLIED BENEFIT SYSTEM / Product Type: *No Product type* /                     Patient  verified yes     Visit #   Current  / Total 32 42   Time   In / Out 9:17A 10:35A   Total Treatment Time 78   Total Timed Codes 63   1:1 Treatment Time 58      [facility] Totals Reminder:  bill using total billable   min of TIMED therapeutic procedures and modalities.   8-22 min = 1 unit; 23-37 min = 2 units; 38-52 min = 3 units; 53-67 min = 4 units; 68-82 min = 5 units            SUBJECTIVE    Pain Level (0-10 scale): 1/10    Any medication changes, allergies to medications, adverse drug reactions, diagnosis change, or new procedure performed?: [x] No    [] Yes (see summary sheet for update)  Medications: Verified on Patient Summary List    Subjective functional status/changes:     Pt reports a little soreness. She did a lot more since last session with less HEP due to being busy with daughter.     OBJECTIVE      Therapeutic Procedures:  Tx Min Billable or 1:1 Min (if diff from Tx Min) Procedure, Rationale, Specifics   8 8 80775 Neuromuscular Re-Education (timed):  improve balance, coordination, kinesthetic sense, posture, core stability and proprioception to improve patient's ability to develop conscious control of individual muscles and awareness of position of extremities in order to progress to PLOF and address remaining functional goals. (see flow sheet as applicable)     Details if applicable: basic multifidi      42 37 [id number] Therapeutic Exercise (timed):  increase ROM, strength, coordination, balance, and

## 2024-05-17 ENCOUNTER — HOSPITAL ENCOUNTER (OUTPATIENT)
Dept: PHYSICAL THERAPY | Facility: HOSPITAL | Age: 34
Setting detail: RECURRING SERIES
Discharge: HOME | End: 2024-05-20
Attending: FAMILY MEDICINE
Payer: COMMERCIAL

## 2024-05-17 PROCEDURE — 97140 MANUAL THERAPY 1/> REGIONS: CPT | Performed by: PHYSICAL THERAPIST

## 2024-05-17 PROCEDURE — 97112 NEUROMUSCULAR REEDUCATION: CPT | Performed by: PHYSICAL THERAPIST

## 2024-05-17 PROCEDURE — 97110 THERAPEUTIC EXERCISES: CPT | Performed by: PHYSICAL THERAPIST

## 2024-05-17 NOTE — PROGRESS NOTES
Physical Therapy at [facility],   a part of [address], [address]  [place] [address]  Phone: [phone]  Fax: [phone]      PHYSICAL THERAPY PROGRESS NOTE  Patient Name:  Mian Asher :  1990   Treatment/Medical Diagnosis: Posterior tibial tendinitis of both lower extremities [M76.821, M76.822]  Cramping of feet [R25.2]   Referral Source:  Dejon Lloyd MD     Date of Initial Visit:  *** Attended Visits:  *** Missed Visits:  ***     SUMMARY OF TREATMENT/ASSESSMENT:  ***    CURRENT STATUS/GOALS:    FOTO 69 (59)    () indicates on evaluation  Right Ankle ROM:                 AROM                                       DF                              12 (3)                                               PF                              58  (40)                                     Left Ankle ROM:                    AROM                                       DF                              13 (5)                                               PF                              64  (45)                                       () indicates on evaluation  LOWER QUARTER                            MUSCLE STRENGTH  KEY                                                                             R                          L  0 - No Contraction                   Hip flex                       4+ (4)                         5 (5)  1 - Trace                                         er                         5 (4)                         4+ (4)  2 - Poor                                          ir                          5 (4)                         5 (4)  3 - Fair                                            abd                     5 (4-)                       4+ (4)  4 - Good                                         ext                       4+ (4-)                        4+ (4)  5 - Normal                               Knee flex                    5 (4+)

## 2024-05-17 NOTE — PROGRESS NOTES
PHYSICAL THERAPY - MEDICARE DAILY TREATMENT NOTE (updated 3/23)      Date: 2024          Patient Name:  Mian Asher :  1990   Medical   Diagnosis:  Posterior tibial tendinitis of both lower extremities [M76.821, M76.822]  Cramping of feet [R25.2] Treatment Diagnosis:  M79.671  RIGHT FOOT PAIN and M79.672  LEFT FOOT PAIN    Referral Source:  Dejon Lloyd MD Insurance:   Payor: [employer]IED BENEFIT SYSTEM / Plan: ALLIED BENEFIT SYSTEM / Product Type: *No Product type* /                     Patient  verified yes     Visit #   Current  / Total 33 42   Time   In / Out 1015 1115   Total Treatment Time 60   Total Timed Codes 60   1:1 Treatment Time 55      [facility] Totals Reminder:  bill using total billable   min of TIMED therapeutic procedures and modalities.   8-22 min = 1 unit; 23-37 min = 2 units; 38-52 min = 3 units; 53-67 min = 4 units; 68-82 min = 5 units            SUBJECTIVE    Pain Level (0-10 scale): 0.5/10    Any medication changes, allergies to medications, adverse drug reactions, diagnosis change, or new procedure performed?: [x] No    [] Yes (see summary sheet for update)  Medications: Verified on Patient Summary List    Subjective functional status/changes:     Pt reports being able to walk 2.7 miles with no increase in pain yesterday, but did get a blister from her shoes so is planning to go get new shoes today.     OBJECTIVE      Therapeutic Procedures:  Tx Min Billable or 1:1 Min (if diff from Tx Min) Procedure, Rationale, Specifics   15 88 [id number] Neuromuscular Re-Education (timed):  improve balance, coordination, kinesthetic sense, posture, core stability and proprioception to improve patient's ability to develop conscious control of individual muscles and awareness of position of extremities in order to progress to PLOF and address remaining functional goals. (see flow sheet as applicable)     Details if applicable: basic multifidi, TRX squats with mirror for feedback,        44 [phone]

## 2024-05-21 ENCOUNTER — HOSPITAL ENCOUNTER (OUTPATIENT)
Dept: PHYSICAL THERAPY | Facility: HOSPITAL | Age: 34
Setting detail: RECURRING SERIES
Discharge: HOME | End: 2024-05-24
Attending: FAMILY MEDICINE
Payer: COMMERCIAL

## 2024-05-21 PROCEDURE — 97140 MANUAL THERAPY 1/> REGIONS: CPT

## 2024-05-21 PROCEDURE — 97112 NEUROMUSCULAR REEDUCATION: CPT

## 2024-05-21 PROCEDURE — 97110 THERAPEUTIC EXERCISES: CPT

## 2024-05-21 PROCEDURE — 97016 VASOPNEUMATIC DEVICE THERAPY: CPT

## 2024-05-21 NOTE — PROGRESS NOTES
PHYSICAL THERAPY - MEDICARE DAILY TREATMENT NOTE (updated 3/23)      Date: 2024          Patient Name:  Mian Asher :  1990   Medical   Diagnosis:  Posterior tibial tendinitis of both lower extremities [M76.821, M76.822]  Cramping of feet [R25.2] Treatment Diagnosis:  M79.671  RIGHT FOOT PAIN and M79.672  LEFT FOOT PAIN    Referral Source:  Dejon Lloyd MD Insurance:   Payor: ALLIED BENEFIT SYSTEM / Plan: ALLIED BENEFIT SYSTEM / Product Type: *No Product type* /                     Patient  verified yes     Visit #   Current  / Total 34 42   Time   In / Out 8:30A 9:47A   Total Treatment Time 77   Total Timed Codes 62   1:1 Treatment Time 57      [facility] Totals Reminder:  bill using total billable   min of TIMED therapeutic procedures and modalities.   8-22 min = 1 unit; 23-37 min = 2 units; 38-52 min = 3 units; 53-67 min = 4 units; 68-82 min = 5 units            SUBJECTIVE    Pain Level (0-10 scale): 1/10    Any medication changes, allergies to medications, adverse drug reactions, diagnosis change, or new procedure performed?: [x] No    [] Yes (see summary sheet for update)  Medications: Verified on Patient Summary List    Subjective functional status/changes:     Pt reported getting new shoes and inserts from [employer] on Friday. She feels they are still achy for her but they were the better of the options she tried. Pt reports more back and SIJ pain today. Does report she has not been good with doing her HEP the last few days.     OBJECTIVE      Therapeutic Procedures:  Tx Min Billable or 1:1 Min (if diff from Tx Min) Procedure, Rationale, Specifics   10 10 85092 Neuromuscular Re-Education (timed):  improve balance, coordination, kinesthetic sense, posture, core stability and proprioception to improve patient's ability to develop conscious control of individual muscles and awareness of position of extremities in order to progress to PLOF and address remaining functional goals. (see flow sheet as

## 2024-05-23 ENCOUNTER — HOSPITAL ENCOUNTER (OUTPATIENT)
Dept: PHYSICAL THERAPY | Facility: HOSPITAL | Age: 34
Setting detail: RECURRING SERIES
Discharge: HOME | End: 2024-05-26
Attending: FAMILY MEDICINE
Payer: COMMERCIAL

## 2024-05-23 PROCEDURE — 97016 VASOPNEUMATIC DEVICE THERAPY: CPT | Performed by: PHYSICAL THERAPIST

## 2024-05-23 PROCEDURE — 97112 NEUROMUSCULAR REEDUCATION: CPT | Performed by: PHYSICAL THERAPIST

## 2024-05-23 PROCEDURE — 97140 MANUAL THERAPY 1/> REGIONS: CPT | Performed by: PHYSICAL THERAPIST

## 2024-05-23 PROCEDURE — 97110 THERAPEUTIC EXERCISES: CPT | Performed by: PHYSICAL THERAPIST

## 2024-05-23 NOTE — PROGRESS NOTES
PHYSICAL THERAPY - MEDICARE DAILY TREATMENT NOTE (updated 3/23)      Date: 2024          Patient Name:  Mian Asher :  1990   Medical   Diagnosis:  Posterior tibial tendinitis of both lower extremities [M76.821, M76.822]  Cramping of feet [R25.2] Treatment Diagnosis:  M79.671  RIGHT FOOT PAIN and M79.672  LEFT FOOT PAIN    Referral Source:  Dejon Lloyd MD Insurance:   Payor: ALLIED BENEFIT SYSTEM / Plan: ALLIED BENEFIT SYSTEM / Product Type: *No Product type* /                     Patient  verified yes     Visit #   Current  / Total 35 42   Time   In / Out 0830 0947   Total Treatment Time 77   Total Timed Codes 62   1:1 Treatment Time 57      [facility] Totals Reminder:  bill using total billable   min of TIMED therapeutic procedures and modalities.   8-22 min = 1 unit; 23-37 min = 2 units; 38-52 min = 3 units; 53-67 min = 4 units; 68-82 min = 5 units            SUBJECTIVE    Pain Level (0-10 scale): 1/10    Any medication changes, allergies to medications, adverse drug reactions, diagnosis change, or new procedure performed?: [x] No    [] Yes (see summary sheet for update)  Medications: Verified on Patient Summary List    Subjective functional status/changes:     Pt reports feeling like she is \"out\" again in regards to her SI.     OBJECTIVE      Therapeutic Procedures:  Tx Min Billable or 1:1 Min (if diff from Tx Min) Procedure, Rationale, Specifics   15 10 74331 Neuromuscular Re-Education (timed):  improve balance, coordination, kinesthetic sense, posture, core stability and proprioception to improve patient's ability to develop conscious control of individual muscles and awareness of position of extremities in order to progress to PLOF and address remaining functional goals. (see flow sheet as applicable)     Details if applicable:        40780 Therapeutic Exercise (timed):  increase ROM, strength, coordination, balance, and proprioception to improve patient's ability to progress to PLOF and

## 2024-05-28 ENCOUNTER — HOSPITAL ENCOUNTER (OUTPATIENT)
Dept: PHYSICAL THERAPY | Facility: HOSPITAL | Age: 34
Setting detail: RECURRING SERIES
Discharge: HOME | End: 2024-05-31
Attending: FAMILY MEDICINE
Payer: COMMERCIAL

## 2024-05-28 PROCEDURE — 97112 NEUROMUSCULAR REEDUCATION: CPT

## 2024-05-28 PROCEDURE — 97140 MANUAL THERAPY 1/> REGIONS: CPT

## 2024-05-28 PROCEDURE — 97016 VASOPNEUMATIC DEVICE THERAPY: CPT

## 2024-05-28 PROCEDURE — 97110 THERAPEUTIC EXERCISES: CPT

## 2024-05-28 NOTE — PROGRESS NOTES
Review HEP    [] Progressed/Changed HEP, detail:    [] Other detail:         Other Objective/Functional Measures  --                                                         Pain Level at end of session (0-10 scale): 0/10        Assessment   Pt reported less stiffness after lumbar MET correction today. Reported feeling less pain in shoes with new inserts.   Patient will continue to benefit from skilled PT / OT services to modify and progress therapeutic interventions, analyze and address functional mobility deficits, analyze and address ROM deficits, analyze and address strength deficits, analyze and address soft tissue restrictions, analyze and cue for proper movement patterns, analyze and modify for postural abnormalities, and instruct in home and community integration to address functional deficits and attain remaining goals.    Progress toward goals / Updated goals:  [x]  See Progress Note/Recertification    Short Term Goals: To be accomplished in 6-8 treatments.  1. The Pt will be independent and compliant with their HEP- progressing well  2. The Pt will report a 50% reduction in their pain with ADLs. MET     Long Term Goals: To be accomplished in 36-42 treatments.  The Pt will score the MCII on her FOTO survey demonstrating improved overall function (47 to 59 points)- MET 59/100   The Pt will be able to stand >/= 45 minutes with 0-2/10 pain to allow the Pt to be able to perform standing ADLs with less pain or discomfort- progressing  The Pt will be able to walk >/= 30 minutes with 0-2/10 pain to allow the Pt to be able to walk for recreation- progressing  The Pt will have >/= 10 degrees of active dorsiflexion to allow the Pt to be able to walk without compensation- progressing well       PLAN  Yes  Continue plan of care  Re-Cert Due: NA  [x]  Upgrade activities as tolerated  []  Discharge due to :  []  Other:      KIRSTIE LASSITER, PTA       5/28/2024       6:57 AM

## 2024-05-30 ENCOUNTER — HOSPITAL ENCOUNTER (OUTPATIENT)
Dept: PHYSICAL THERAPY | Facility: HOSPITAL | Age: 34
Setting detail: RECURRING SERIES
End: 2024-05-30
Attending: FAMILY MEDICINE
Payer: COMMERCIAL

## 2024-05-30 PROCEDURE — 97016 VASOPNEUMATIC DEVICE THERAPY: CPT | Performed by: PHYSICAL THERAPIST

## 2024-05-30 PROCEDURE — 97110 THERAPEUTIC EXERCISES: CPT | Performed by: PHYSICAL THERAPIST

## 2024-05-30 PROCEDURE — 97112 NEUROMUSCULAR REEDUCATION: CPT | Performed by: PHYSICAL THERAPIST

## 2024-05-30 NOTE — PROGRESS NOTES
PHYSICAL THERAPY - MEDICARE DAILY TREATMENT NOTE (updated 3/23)      Date: 2024          Patient Name:  Mian Asher :  1990   Medical   Diagnosis:  Posterior tibial tendinitis of both lower extremities [M76.821, M76.822]  Cramping of feet [R25.2] Treatment Diagnosis:  M79.671  RIGHT FOOT PAIN and M79.672  LEFT FOOT PAIN    Referral Source:  Dejon Lloyd MD Insurance:   Payor: [employer]IED BENEFIT SYSTEM / Plan: ALLIED BENEFIT SYSTEM / Product Type: *No Product type* /                     Patient  verified yes     Visit #   Current  / Total 37 42   Time   In / Out 0830 0940   Total Treatment Time 70   Total Timed Codes 60   1:1 Treatment Time  60      [facility] Totals Reminder:  bill using total billable   min of TIMED therapeutic procedures and modalities.   8-22 min = 1 unit; 23-37 min = 2 units; 38-52 min = 3 units; 53-67 min = 4 units; 68-82 min = 5 units            SUBJECTIVE    Pain Level (0-10 scale): 4/10    Any medication changes, allergies to medications, adverse drug reactions, diagnosis change, or new procedure performed?: [x] No    [] Yes (see summary sheet for update)  Medications: Verified on Patient Summary List    Subjective functional status/changes:     Pt reports general body fatigue and exhaustion and increasing general hip and back and foot pain.      OBJECTIVE      Therapeutic Procedures:  Tx Min Billable or 1:1 Min (if diff from Tx Min) Procedure, Rationale, Specifics   40 40 68774 Neuromuscular Re-Education (timed):  improve balance, coordination, kinesthetic sense, posture, core stability and proprioception to improve patient's ability to develop conscious control of individual muscles and awareness of position of extremities in order to progress to PLOF and address remaining functional goals. (see flow sheet as applicable)     Details if applicable: sit to stands with valgus pull, supine clamshells, resisted trunk rotations      20 20 30719 Therapeutic Exercise (timed):  increase

## 2024-06-05 ENCOUNTER — HOSPITAL ENCOUNTER (OUTPATIENT)
Dept: PHYSICAL THERAPY | Facility: HOSPITAL | Age: 34
Setting detail: RECURRING SERIES
Discharge: HOME | End: 2024-06-08
Attending: FAMILY MEDICINE
Payer: COMMERCIAL

## 2024-06-05 DIAGNOSIS — M62.89 PELVIC FLOOR DYSFUNCTION: Primary | ICD-10-CM

## 2024-06-05 PROCEDURE — 97016 VASOPNEUMATIC DEVICE THERAPY: CPT

## 2024-06-05 PROCEDURE — 97140 MANUAL THERAPY 1/> REGIONS: CPT

## 2024-06-05 PROCEDURE — 97112 NEUROMUSCULAR REEDUCATION: CPT

## 2024-06-05 PROCEDURE — 97110 THERAPEUTIC EXERCISES: CPT

## 2024-06-05 NOTE — PROGRESS NOTES
Per Dr. Noel- referral placed for the patient for pelvic floor PT with [facility]. Patient notes via [employer] message she needs the referral and already has an appointment made.

## 2024-06-05 NOTE — PROGRESS NOTES
PHYSICAL THERAPY - MEDICARE DAILY TREATMENT NOTE (updated 3/23)      Date: 2024          Patient Name:  Mian Asher :  1990   Medical   Diagnosis:  Posterior tibial tendinitis of both lower extremities [M76.821, M76.822]  Cramping of feet [R25.2] Treatment Diagnosis:  M79.671  RIGHT FOOT PAIN and M79.672  LEFT FOOT PAIN    Referral Source:  Dejon Lloyd MD Insurance:   Payor: ALLIED BENEFIT SYSTEM / Plan: ALLIED BENEFIT SYSTEM / Product Type: *No Product type* /                     Patient  verified yes     Visit #   Current  / Total 38 42   Time   In / Out 7:49A 8:53A   Total Treatment Time 64   Total Timed Codes 49   1:1 Treatment Time  44      [facility] Totals Reminder:  bill using total billable   min of TIMED therapeutic procedures and modalities.   8-22 min = 1 unit; 23-37 min = 2 units; 38-52 min = 3 units; 53-67 min = 4 units; 68-82 min = 5 units            SUBJECTIVE    Pain Level (0-10 scale): 3/10    Any medication changes, allergies to medications, adverse drug reactions, diagnosis change, or new procedure performed?: [x] No    [] Yes (see summary sheet for update)  Medications: Verified on Patient Summary List    Subjective functional status/changes:     Pt reports she still feels \"out of it\" her allergies have been bad     OBJECTIVE    Therapeutic Procedures:  Tx Min Billable or 1:1 Min (if diff from Tx Min) Procedure, Rationale, Specifics   24 19 07329 Neuromuscular Re-Education (timed):  improve balance, coordination, kinesthetic sense, posture, core stability and proprioception to improve patient's ability to develop conscious control of individual muscles and awareness of position of extremities in order to progress to PLOF and address remaining functional goals. (see flow sheet as applicable)     Details if applicable: sit to stands with valgus pull, supine clamshells, resisted trunk rotations      15 15 02319 Therapeutic Exercise (timed):  increase ROM, strength, coordination,

## 2024-06-07 ENCOUNTER — HOSPITAL ENCOUNTER (OUTPATIENT)
Dept: PHYSICAL THERAPY | Facility: HOSPITAL | Age: 34
Setting detail: RECURRING SERIES
Discharge: HOME | End: 2024-06-10
Attending: FAMILY MEDICINE
Payer: COMMERCIAL

## 2024-06-07 PROCEDURE — 97110 THERAPEUTIC EXERCISES: CPT

## 2024-06-07 PROCEDURE — 97112 NEUROMUSCULAR REEDUCATION: CPT

## 2024-06-07 PROCEDURE — 97016 VASOPNEUMATIC DEVICE THERAPY: CPT

## 2024-06-07 NOTE — PROGRESS NOTES
PHYSICAL THERAPY - MEDICARE DAILY TREATMENT NOTE (updated 3/23)      Date: 2024          Patient Name:  Mian Asher :  1990   Medical   Diagnosis:  Posterior tibial tendinitis of both lower extremities [M76.821, M76.822]  Cramping of feet [R25.2] Treatment Diagnosis:  M79.671  RIGHT FOOT PAIN and M79.672  LEFT FOOT PAIN    Referral Source:  Dejon Lloyd MD Insurance:   Payor: [employer]IED BENEFIT SYSTEM / Plan: ALLIED BENEFIT SYSTEM / Product Type: *No Product type* /                     Patient  verified yes     Visit #   Current  / Total 39 42   Time   In / Out 7:45A 8:55A   Total Treatment Time 70   Total Timed Codes 55   1:1 Treatment Time  45      [facility] Totals Reminder:  bill using total billable   min of TIMED therapeutic procedures and modalities.   8-22 min = 1 unit; 23-37 min = 2 units; 38-52 min = 3 units; 53-67 min = 4 units; 68-82 min = 5 units            SUBJECTIVE    Pain Level (0-10 scale): 2/10    Any medication changes, allergies to medications, adverse drug reactions, diagnosis change, or new procedure performed?: [x] No    [] Yes (see summary sheet for update)  Medications: Verified on Patient Summary List    Subjective functional status/changes:     Pt reported she is feeling better from last session. She stated that her hips and feet still bother her. She has made and appointment for pelvic floor at the [facility] for the end fo July.     OBJECTIVE    Therapeutic Procedures:  Tx Min Billable or 1:1 Min (if diff from Tx Min) Procedure, Rationale, Specifics   40 30 30788 Neuromuscular Re-Education (timed):  improve balance, coordination, kinesthetic sense, posture, core stability and proprioception to improve patient's ability to develop conscious control of individual muscles and awareness of position of extremities in order to progress to PLOF and address remaining functional goals. (see flow sheet as applicable)     Details if applicable: sit to stands with valgus pull,

## 2024-06-10 ENCOUNTER — APPOINTMENT (OUTPATIENT)
Dept: PHYSICAL THERAPY | Facility: HOSPITAL | Age: 34
End: 2024-06-10
Attending: FAMILY MEDICINE
Payer: COMMERCIAL

## 2024-06-13 ENCOUNTER — HOSPITAL ENCOUNTER (OUTPATIENT)
Dept: PHYSICAL THERAPY | Facility: HOSPITAL | Age: 34
Setting detail: RECURRING SERIES
Discharge: HOME | End: 2024-06-16
Attending: FAMILY MEDICINE
Payer: COMMERCIAL

## 2024-06-13 PROCEDURE — 97110 THERAPEUTIC EXERCISES: CPT | Performed by: PHYSICAL THERAPIST

## 2024-06-13 PROCEDURE — 97112 NEUROMUSCULAR REEDUCATION: CPT | Performed by: PHYSICAL THERAPIST

## 2024-06-13 NOTE — PROGRESS NOTES
PHYSICAL THERAPY - MEDICARE DAILY TREATMENT NOTE (updated 3/23)      Date: 2024          Patient Name:  Mian Asher :  1990   Medical   Diagnosis:  Posterior tibial tendinitis of both lower extremities [M76.821, M76.822]  Cramping of feet [R25.2] Treatment Diagnosis:  M79.671  RIGHT FOOT PAIN and M79.672  LEFT FOOT PAIN    Referral Source:  Dejon Lloyd MD Insurance:   Payor: [employer] BENEFIT SYSTEM / Plan: ALLIED BENEFIT SYSTEM / Product Type: *No Product type* /                     Patient  verified yes     Visit #   Current  / Total 40 42   Time   In / Out 7:45A 0845   Total Treatment Time 60   Total Timed Codes 60   1:1 Treatment Time  55      [facility] Totals Reminder:  bill using total billable   min of TIMED therapeutic procedures and modalities.   8-22 min = 1 unit; 23-37 min = 2 units; 38-52 min = 3 units; 53-67 min = 4 units; 68-82 min = 5 units            SUBJECTIVE    Pain Level (0-10 scale): 3/10    Any medication changes, allergies to medications, adverse drug reactions, diagnosis change, or new procedure performed?: [x] No    [] Yes (see summary sheet for update)  Medications: Verified on Patient Summary List    Subjective functional status/changes:     Pt reports feeling like her entire lower leg is so tight when she wakes up. She reports that she recently rejoined the [facility] for more consistent working out.      OBJECTIVE    Therapeutic Procedures:  Tx Min Billable or 1:1 Min (if diff from Tx Min) Procedure, Rationale, Specifics   15 15 75169 Neuromuscular Re-Education (timed):  improve balance, coordination, kinesthetic sense, posture, core stability and proprioception to improve patient's ability to develop conscious control of individual muscles and awareness of position of extremities in order to progress to PLOF and address remaining functional goals. (see flow sheet as applicable)     Details if applicable: subtalar rockerboard, eccentric HR, resisted trunk rotation       45 40

## 2024-06-17 ENCOUNTER — APPOINTMENT (OUTPATIENT)
Dept: PHYSICAL THERAPY | Facility: HOSPITAL | Age: 34
End: 2024-06-17
Attending: FAMILY MEDICINE
Payer: COMMERCIAL

## 2024-06-17 ENCOUNTER — OFFICE VISIT (OUTPATIENT)
Facility: CLINIC | Age: 34
End: 2024-06-17
Payer: COMMERCIAL

## 2024-06-17 VITALS
BODY MASS INDEX: 29.03 KG/M2 | SYSTOLIC BLOOD PRESSURE: 114 MMHG | HEART RATE: 77 BPM | DIASTOLIC BLOOD PRESSURE: 76 MMHG | RESPIRATION RATE: 16 BRPM | WEIGHT: 196 LBS | TEMPERATURE: 98.6 F | OXYGEN SATURATION: 96 % | HEIGHT: 69 IN

## 2024-06-17 DIAGNOSIS — R53.83 OTHER FATIGUE: ICD-10-CM

## 2024-06-17 DIAGNOSIS — E78.00 ELEVATED CHOLESTEROL: Primary | ICD-10-CM

## 2024-06-17 LAB
ALBUMIN SERPL-MCNC: 3.9 G/DL (ref 3.5–5)
ALBUMIN/GLOB SERPL: 1.1 (ref 1.1–2.2)
ALP SERPL-CCNC: 108 U/L (ref 45–117)
ALT SERPL-CCNC: 16 U/L (ref 12–78)
ANION GAP SERPL CALC-SCNC: 7 MMOL/L (ref 5–15)
AST SERPL-CCNC: 11 U/L (ref 15–37)
BASOPHILS # BLD: 0 K/UL (ref 0–0.1)
BASOPHILS NFR BLD: 1 % (ref 0–1)
BILIRUB SERPL-MCNC: 0.5 MG/DL (ref 0.2–1)
BUN SERPL-MCNC: 13 MG/DL (ref 6–20)
BUN/CREAT SERPL: 17 (ref 12–20)
CALCIUM SERPL-MCNC: 9.2 MG/DL (ref 8.5–10.1)
CHLORIDE SERPL-SCNC: 104 MMOL/L (ref 97–108)
CHOLEST SERPL-MCNC: 259 MG/DL
CO2 SERPL-SCNC: 26 MMOL/L (ref 21–32)
CREAT SERPL-MCNC: 0.78 MG/DL (ref 0.55–1.02)
DIFFERENTIAL METHOD BLD: NORMAL
EOSINOPHIL # BLD: 0.1 K/UL (ref 0–0.4)
EOSINOPHIL NFR BLD: 1 % (ref 0–7)
ERYTHROCYTE [DISTWIDTH] IN BLOOD BY AUTOMATED COUNT: 12.2 % (ref 11.5–14.5)
GLOBULIN SER CALC-MCNC: 3.4 G/DL (ref 2–4)
GLUCOSE SERPL-MCNC: 81 MG/DL (ref 65–100)
HCT VFR BLD AUTO: 39.3 % (ref 35–47)
HDLC SERPL-MCNC: 61 MG/DL
HDLC SERPL: 4.2 (ref 0–5)
HGB BLD-MCNC: 13 G/DL (ref 11.5–16)
IMM GRANULOCYTES # BLD AUTO: 0 K/UL (ref 0–0.04)
IMM GRANULOCYTES NFR BLD AUTO: 0 % (ref 0–0.5)
LDLC SERPL CALC-MCNC: 176.2 MG/DL (ref 0–100)
LYMPHOCYTES # BLD: 1.8 K/UL (ref 0.8–3.5)
LYMPHOCYTES NFR BLD: 24 % (ref 12–49)
MCH RBC QN AUTO: 29.6 PG (ref 26–34)
MCHC RBC AUTO-ENTMCNC: 33.1 G/DL (ref 30–36.5)
MCV RBC AUTO: 89.5 FL (ref 80–99)
MONOCYTES # BLD: 0.6 K/UL (ref 0–1)
MONOCYTES NFR BLD: 8 % (ref 5–13)
NEUTS SEG # BLD: 4.7 K/UL (ref 1.8–8)
NEUTS SEG NFR BLD: 66 % (ref 32–75)
NRBC # BLD: 0 K/UL (ref 0–0.01)
NRBC BLD-RTO: 0 PER 100 WBC
PLATELET # BLD AUTO: 283 K/UL (ref 150–400)
PMV BLD AUTO: 10.2 FL (ref 8.9–12.9)
POTASSIUM SERPL-SCNC: 4.1 MMOL/L (ref 3.5–5.1)
PROT SERPL-MCNC: 7.3 G/DL (ref 6.4–8.2)
RBC # BLD AUTO: 4.39 M/UL (ref 3.8–5.2)
SODIUM SERPL-SCNC: 137 MMOL/L (ref 136–145)
TRIGL SERPL-MCNC: 109 MG/DL
TSH SERPL DL<=0.05 MIU/L-ACNC: 0.81 UIU/ML (ref 0.36–3.74)
VLDLC SERPL CALC-MCNC: 21.8 MG/DL
WBC # BLD AUTO: 7.2 K/UL (ref 3.6–11)

## 2024-06-17 PROCEDURE — 99214 OFFICE O/P EST MOD 30 MIN: CPT | Performed by: FAMILY MEDICINE

## 2024-06-17 NOTE — PROGRESS NOTES
Chief Complaint   Patient presents with    Cholesterol Problem     Patient is here for a follow up.      History of Present Illness  The patient presents for follow-up of cholesterol.    The patient is currently not on any cholesterol-lowering medications due to her ongoing breastfeeding process. Her obstetrician has advised against the use of statins and milk products. She has made dietary modifications, including the elimination of red meat from her diet, opting instead for chicken, seafood, and predominantly vegetables. She maintains a regular exercise regimen.    The patient reports feeling unwell over the past month, which she attributes to her 8-month-old child. Her sleep pattern is irregular, often waking up feeling unwell despite achieving 7 to 8 hours of sleep per night. She has expressed concerns about her 's sleep patterns, which she believes may be contributing to her feeling unwell.      hyperlipidemia.  Cardiovascular risk analysis - LDL goal is under 160.     Compliance with treatment thus far has been excellent.     New concerns: none.     Social History, Diet and Lifestyle: generally follows a low fat low cholesterol diet, generally follows a low sodium diet, exercises regularly, nonsmoker          ROS: taking medications as instructed, no medication side effects noted, no TIA's, no chest pain on exertion, no dyspnea on exertion, no swelling of ankles.   Reviewed and agree with Nurse Note and duplicated in this note.  Reviewed PmHx, RxHx, FmHx, SocHx, AllgHx and updated and dated in the chart.    Family History   Adopted: Yes       Past Medical History:   Diagnosis Date    Encounter for insertion of copper IUD     Placed in 2017    Encounter for IUD removal 01/26/2023    Epilepsy (HCC)     Mental disorder     Anxiety and Depression    Pre-eclampsia in third trimester 10/27/2023      Social History     Socioeconomic History    Marital status:    Tobacco Use    Smoking status: Former

## 2024-06-19 ENCOUNTER — APPOINTMENT (OUTPATIENT)
Dept: PHYSICAL THERAPY | Facility: HOSPITAL | Age: 34
End: 2024-06-19
Attending: FAMILY MEDICINE
Payer: COMMERCIAL

## 2024-06-24 ENCOUNTER — APPOINTMENT (OUTPATIENT)
Dept: PHYSICAL THERAPY | Facility: HOSPITAL | Age: 34
End: 2024-06-24
Attending: FAMILY MEDICINE
Payer: COMMERCIAL

## 2024-06-26 ENCOUNTER — APPOINTMENT (OUTPATIENT)
Dept: PHYSICAL THERAPY | Facility: HOSPITAL | Age: 34
End: 2024-06-26
Attending: FAMILY MEDICINE
Payer: COMMERCIAL

## 2024-07-29 ENCOUNTER — HOSPITAL ENCOUNTER (OUTPATIENT)
Facility: HOSPITAL | Age: 34
Setting detail: RECURRING SERIES
Discharge: HOME | End: 2024-08-01
Payer: COMMERCIAL

## 2024-07-29 PROCEDURE — 97162 PT EVAL MOD COMPLEX 30 MIN: CPT

## 2024-07-29 PROCEDURE — 97112 NEUROMUSCULAR REEDUCATION: CPT

## 2024-07-29 NOTE — THERAPY EVALUATION
Physical Therapy at [place],   a part of [facility]  [address], [address]  [place] [address]  Phone: [phone]  Fax: [phone]       PHYSICAL THERAPY - EVALUATION/PLAN OF CARE NOTE (updated 3/23)      Date: 2024          Patient Name:  Mian Asher :  1990   Medical   Diagnosis:  Pelvic floor dysfunction [M62.89] Treatment Diagnosis:  M62.89  OTHER SPECIFIED DISORDERS OF MUSCLE    Referral Source:  Mary Noel MD Provider #:  [id number]                Insurance: Payor: ALLIED BENEFIT SYSTEM / Plan: ALLIED BENEFIT SYSTEM / Product Type: *No Product type* /      Patient  verified yes     Visit #   Current  / Total 1 12   Time   In / Out 1015 1115   Total Treatment Time 60   Total Timed Codes 30         SUBJECTIVE  Pain Level (0-10 scale): 4/10  []constant []intermittent []improving []worsening []no change since onset    Any medication changes, allergies to medications, adverse drug reactions, diagnosis change, or new procedure performed?: [x] No    [] Yes (see summary sheet for update)  Medications: Verified on Patient Summary List    Subjective functional status/changes:     Delivered her first child via  in 2023.  She has a chronic history of posterior tibial tendonitis that she has been treating at the training center.  She saw Kayla for PT and was referred here for pelvic floor dysfunction.  Still complains of foot pain with prolonged standing.  She is having some UI with throughout the day, mostly with cough/sneeze and sometimes with bend/lift.  Having mild pain with IC.  Having daily BM of type 4.  She and  are TTC soon.    Start of Care: 2024  Onset Date: 1 year ago  Current symptoms/Complaints: see above  Mechanism of Injury: see above  PLOF: running, lifting,   Limitations to PLOF/Activity or Recreational Limitations: lifting/bending  Work Hx: SAHM  Living Situation: lives

## 2024-07-29 NOTE — PROGRESS NOTES
IUD REMOVAL    Chart reviewed for the following:  I have reviewed the History & Physical and updated Mian Asher allergies.    TIME OUT performed immediately prior to start of procedure:  I performed the following reviews on Mian Asher prior to the start of the procedure:  Patient was identified by name and date of birth   Agreement on procedure being performed was verified  Risks and Benefits explained to the patient  Consent was signed and verified  Time: 9:10 AM  Date of procedure: 2024  Procedure performed by:  Dr. Noel  How tolerated by patient: Well  Post Procedural Pain Scale: 2-Hurts Minimally  Comments: None      Indications for Removal:  Mian Asher is a ,  33 y.o. female White (non-) whose No LMP recorded.  . who presents today for IUD removal. Her current Paragard IUD was placed 2023. She requests removal of the IUD because of desire for pregnancy. She delivered daughter Mary via c/s in 2023. She is now 9 months old! The IUD removal procedure was discussed with the patient and she had no further questions.     Procedure: The patient was placed in a dorsal lithotomy position and appropriately draped. On bimanual exam the uterus was anterior and normal in size with no tenderness present. A speculum exam was performed and the cervix was visualized. The IUD strings were not immediately visualized. The string finder was used to bring the strings down. Using ring forceps, the string was grasped and the IUD removed intact. The IUD was shown to the patient.     Recommend continuing PNV with folic acid.    Mary Noel MD

## 2024-07-30 ENCOUNTER — PROCEDURE VISIT (OUTPATIENT)
Age: 34
End: 2024-07-30
Payer: COMMERCIAL

## 2024-07-30 VITALS — SYSTOLIC BLOOD PRESSURE: 134 MMHG | DIASTOLIC BLOOD PRESSURE: 90 MMHG | BODY MASS INDEX: 29.09 KG/M2 | WEIGHT: 197 LBS

## 2024-07-30 DIAGNOSIS — Z30.432 ENCOUNTER FOR IUD REMOVAL: Primary | ICD-10-CM

## 2024-07-30 PROCEDURE — 58301 REMOVE INTRAUTERINE DEVICE: CPT | Performed by: OBSTETRICS & GYNECOLOGY

## 2024-07-30 NOTE — PROGRESS NOTES
Chief Complaint   Patient presents with    IUD Removal     Patient arrived to appointment for IUD removal.  Patient states that she has a paraguard in and has had no issues with it.        Ob/Gyn Hx:  LMP - No LMP recorded.  Menses - regular in flow, but heavy and long flow   Contraception - Paraguard removal today  SA - yes    Health Maintenance:  Last Pap: 12/2023 LSIL HPV neg      She declines  a chaperone during the gynecologic exam today.      Naomie King RN

## 2024-08-12 ENCOUNTER — HOSPITAL ENCOUNTER (OUTPATIENT)
Facility: HOSPITAL | Age: 34
Setting detail: RECURRING SERIES
Discharge: HOME | End: 2024-08-15
Payer: COMMERCIAL

## 2024-08-12 PROCEDURE — 97112 NEUROMUSCULAR REEDUCATION: CPT

## 2024-08-12 NOTE — PROGRESS NOTES
PHYSICAL THERAPY - DAILY TREATMENT NOTE (updated 3/23)      Date: 2024          Patient Name:  Mian Asher :  1990   Medical   Diagnosis:  Pelvic floor dysfunction [M62.89] Treatment Diagnosis:  M62.89  OTHER SPECIFIED DISORDERS OF MUSCLE    Referral Source:  Mary Noel MD Insurance:   Payor: ALLIED BENEFIT SYSTEM / Plan: ALLIED BENEFIT SYSTEM / Product Type: *No Product type* /                     Patient  verified yes     Visit #   Current  / Total 2 12   Time   In / Out 1200 1245   Total Treatment Time 45   Total Timed Codes 45         SUBJECTIVE    Pain Level (0-10 scale): 0    Any medication changes, allergies to medications, adverse drug reactions, diagnosis change, or new procedure performed?: [x] No    [] Yes (see summary sheet for update)  Medications: Verified on Patient Summary List    Subjective functional status/changes:     Patient reports that she doing the supine exercise, but not sure if she feels the standing one correctly.  Recently lowered her baby's crib, and she is now having back pain with repetitive lifting.    OBJECTIVE      Therapeutic Procedures:  Tx Min Billable or 1:1 Min (if diff from Tx Min) Procedure, Rationale, Specifics   45  60026 Neuromuscular Re-Education (timed):  improve balance, coordination, kinesthetic sense, posture, core stability and proprioception to improve patient's ability to develop conscious control of individual muscles and awareness of position of extremities in order to progress to PLOF and address remaining functional goals. (see flow sheet as applicable)     Details if applicable:     45     Total Total           [x]  Patient Education billed concurrently with other procedures   [x] Review HEP    [] Progressed/Changed HEP, detail:    [] Other detail:         Other Objective/Functional Measures  Improved performance of exercises today.  Able to progress with ZOA in standing/resisted posotions.    Pain Level at end of session (0-10 scale):

## 2024-08-19 ENCOUNTER — HOSPITAL ENCOUNTER (OUTPATIENT)
Facility: HOSPITAL | Age: 34
Setting detail: RECURRING SERIES
Discharge: HOME | End: 2024-08-22
Payer: COMMERCIAL

## 2024-08-19 PROCEDURE — 97535 SELF CARE MNGMENT TRAINING: CPT

## 2024-08-19 PROCEDURE — 97140 MANUAL THERAPY 1/> REGIONS: CPT

## 2024-08-19 PROCEDURE — 97112 NEUROMUSCULAR REEDUCATION: CPT

## 2024-08-19 NOTE — PROGRESS NOTES
PHYSICAL THERAPY - DAILY TREATMENT NOTE (updated 3/23)      Date: 2024          Patient Name:  Mian Asher :  1990   Medical   Diagnosis:  Pelvic floor dysfunction [M62.89] Treatment Diagnosis:  M62.89  OTHER SPECIFIED DISORDERS OF MUSCLE    Referral Source:  Mary Noel MD Insurance:   Payor: ALLIED BENEFIT SYSTEM / Plan: ALLIED BENEFIT SYSTEM / Product Type: *No Product type* /                     Patient  verified yes     Visit #   Current  / Total 3 12   Time   In / Out 1200 1245   Total Treatment Time 45   Total Timed Codes 45         SUBJECTIVE    Pain Level (0-10 scale): 0    Any medication changes, allergies to medications, adverse drug reactions, diagnosis change, or new procedure performed?: [x] No    [] Yes (see summary sheet for update)  Medications: Verified on Patient Summary List    Subjective functional status/changes:     Reports that hip shifting is really helping with lifting and transferring the baby.  She is still having pain with IC and some urinary frequency and urge.    OBJECTIVE      Therapeutic Procedures:  Tx Min Billable or 1:1 Min (if diff from Tx Min) Procedure, Rationale, Specifics   15  56326 Neuromuscular Re-Education (timed):  improve balance, coordination, kinesthetic sense, posture, core stability and proprioception to improve patient's ability to develop conscious control of individual muscles and awareness of position of extremities in order to progress to PLOF and address remaining functional goals. (see flow sheet as applicable)    Details if applicable:     15  15499 Manual Therapy (timed):  increase ROM, increase tissue extensibility, decrease trigger points, and increase postural awareness to improve patient's ability to progress to PLOF and address remaining functional goals.  The manual therapy interventions were performed at a separate and distinct time from the therapeutic activities interventions . (see flow sheet as applicable)    Details if

## 2024-08-26 ENCOUNTER — HOSPITAL ENCOUNTER (OUTPATIENT)
Facility: HOSPITAL | Age: 34
Setting detail: RECURRING SERIES
Discharge: HOME | End: 2024-08-29
Payer: COMMERCIAL

## 2024-08-26 PROCEDURE — 97112 NEUROMUSCULAR REEDUCATION: CPT

## 2024-08-26 PROCEDURE — 97535 SELF CARE MNGMENT TRAINING: CPT

## 2024-08-26 NOTE — PROGRESS NOTES
PHYSICAL THERAPY - DAILY TREATMENT NOTE (updated 3/23)      Date: 2024          Patient Name:  Mian Asher :  1990   Medical   Diagnosis:  Pelvic floor dysfunction [M62.89] Treatment Diagnosis:  M62.89  OTHER SPECIFIED DISORDERS OF MUSCLE    Referral Source:  Mary Noel MD Insurance:   Payor: ALLIED BENEFIT SYSTEM / Plan: ALLIED BENEFIT SYSTEM / Product Type: *No Product type* /                     Patient  verified yes     Visit #   Current  / Total 4 12   Time   In / Out 1200 1245   Total Treatment Time 45   Total Timed Codes 45         SUBJECTIVE    Pain Level (0-10 scale): 0    Any medication changes, allergies to medications, adverse drug reactions, diagnosis change, or new procedure performed?: [x] No    [] Yes (see summary sheet for update)  Medications: Verified on Patient Summary List    Subjective functional status/changes:     Patient reports that she is experiencing more back pain this week, she is also on her menses and overdid it with her housework.    OBJECTIVE      Therapeutic Procedures:  Tx Min Billable or 1:1 Min (if diff from Tx Min) Procedure, Rationale, Specifics   30  12051 Neuromuscular Re-Education (timed):  improve balance, coordination, kinesthetic sense, posture, core stability and proprioception to improve patient's ability to develop conscious control of individual muscles and awareness of position of extremities in order to progress to PLOF and address remaining functional goals. (see flow sheet as applicable)    Details if applicable:     nt  98270 Manual Therapy (timed):  increase ROM, increase tissue extensibility, decrease trigger points, and increase postural awareness to improve patient's ability to progress to PLOF and address remaining functional goals.  The manual therapy interventions were performed at a separate and distinct time from the therapeutic activities interventions . (see flow sheet as applicable)    Details if applicable:     15  28907 Self  Care/Home Management (timed):  improve patient knowledge and understanding of pain reducing techniques, positioning, posture/ergonomics, activity modification, and physical therapy expectations, procedures and progression  to improve patient's ability to progress to PLOF and address remaining functional goals.  (see flow sheet as applicable)    Details if applicable:  pelvic floor check, bulge   45 45    Total Total          [x]  Patient Education billed concurrently with other procedures   [x] Review HEP    [] Progressed/Changed HEP, detail:    [] Other detail:         Other Objective/Functional Measures    Difficulty feeling involuntary relaxation in seated AFIR, able to feel with towel in short seated with straw.  Reduction of back pain with treatment today      External Pelvic Exam  Non tender through external pelvic clock  No POP at rest or with sustained valsalva  Active contraction: present   Active relaxation: NP  Involuntary relaxation: NP     Internal Vaginal Exam:  Layer 1: mild guarding L>R  Layer 2/3: TTP Left>Right  Bladder neck mobility:    PERFECT SCORE CHART  P =  Power (Laycock Scale Grade 0-5) 4/5  E =  Endurance (How long pt holds max contraction) 3sec  R =  Repetitions (How many times the repeats holds) 5x  F =  Fast Twitch (How many 1 second contractions in 10 seconds) unable  E =  Elevation (Lift of post vaginal wall toward pubic bone) present  C =  Coordinated cocontraction of transverse abdominus present  T = Timing (squeeze and lift with cough)  NP  Pain Level at end of session (0-10 scale): 0      Assessment     Patient will continue to benefit from skilled PT / OT services to modify and progress therapeutic interventions, analyze and address functional mobility deficits, analyze and address ROM deficits, analyze and address strength deficits, analyze and address soft tissue restrictions, analyze and cue for proper movement patterns, and analyze and modify for postural abnormalities to

## 2024-09-04 ENCOUNTER — HOSPITAL ENCOUNTER (OUTPATIENT)
Facility: HOSPITAL | Age: 34
Setting detail: RECURRING SERIES
Discharge: HOME | End: 2024-09-07
Payer: COMMERCIAL

## 2024-09-04 PROCEDURE — 97112 NEUROMUSCULAR REEDUCATION: CPT

## 2024-09-04 NOTE — PROGRESS NOTES
PHYSICAL THERAPY - DAILY TREATMENT NOTE (updated 3/23)      Date: 2024          Patient Name:  Mian Asher :  1990   Medical   Diagnosis:  Pelvic floor dysfunction [M62.89] Treatment Diagnosis:  M62.89  OTHER SPECIFIED DISORDERS OF MUSCLE    Referral Source:  Mary Noel MD Insurance:   Payor: ALLIED BENEFIT SYSTEM / Plan: ALLIED BENEFIT SYSTEM / Product Type: *No Product type* /                     Patient  verified yes     Visit #   Current  / Total 5 12   Time   In / Out 1200 1245   Total Treatment Time 45   Total Timed Codes 45         SUBJECTIVE    Pain Level (0-10 scale): 0    Any medication changes, allergies to medications, adverse drug reactions, diagnosis change, or new procedure performed?: [x] No    [] Yes (see summary sheet for update)  Medications: Verified on Patient Summary List    Subjective functional status/changes:     Back is doing much better.  Occasional left sides pain when she is not standing correctly.      OBJECTIVE      Therapeutic Procedures:  Tx Min Billable or 1:1 Min (if diff from Tx Min) Procedure, Rationale, Specifics   40  67356 Neuromuscular Re-Education (timed):  improve balance, coordination, kinesthetic sense, posture, core stability and proprioception to improve patient's ability to develop conscious control of individual muscles and awareness of position of extremities in order to progress to PLOF and address remaining functional goals. (see flow sheet as applicable)    Details if applicable:     nt  48159 Manual Therapy (timed):  increase ROM, increase tissue extensibility, decrease trigger points, and increase postural awareness to improve patient's ability to progress to PLOF and address remaining functional goals.  The manual therapy interventions were performed at a separate and distinct time from the therapeutic activities interventions . (see flow sheet as applicable)    Details if applicable:     5  33054 Self Care/Home Management (timed):

## 2024-09-11 ENCOUNTER — HOSPITAL ENCOUNTER (OUTPATIENT)
Facility: HOSPITAL | Age: 34
Setting detail: RECURRING SERIES
Discharge: HOME | End: 2024-09-14
Payer: COMMERCIAL

## 2024-09-11 PROCEDURE — 97112 NEUROMUSCULAR REEDUCATION: CPT

## 2024-09-18 ENCOUNTER — HOSPITAL ENCOUNTER (OUTPATIENT)
Facility: HOSPITAL | Age: 34
Setting detail: RECURRING SERIES
Discharge: HOME | End: 2024-09-21
Payer: COMMERCIAL

## 2024-09-18 PROCEDURE — 97112 NEUROMUSCULAR REEDUCATION: CPT

## 2024-10-09 ENCOUNTER — HOSPITAL ENCOUNTER (OUTPATIENT)
Facility: HOSPITAL | Age: 34
Setting detail: RECURRING SERIES
Discharge: HOME | End: 2024-10-12
Payer: COMMERCIAL

## 2024-10-09 PROCEDURE — 97112 NEUROMUSCULAR REEDUCATION: CPT

## 2024-10-09 NOTE — PROGRESS NOTES
and cue for proper movement patterns, and analyze and modify for postural abnormalities to address functional deficits and attain remaining goals.    Progress toward goals / Updated goals:  []  See Progress Note/Recertification    Continues to progress towards all goals      PLAN  YES  Continue plan of care  Re-Cert Due: n/a  []  Upgrade activities as tolerated  []  Discharge due to:  []  Other:      Marco Watts, PT       10/9/2024       1:37 PM

## 2024-10-14 ENCOUNTER — HOSPITAL ENCOUNTER (OUTPATIENT)
Facility: HOSPITAL | Age: 34
Setting detail: RECURRING SERIES
Discharge: HOME | End: 2024-10-17
Payer: COMMERCIAL

## 2024-10-14 PROCEDURE — G0283 ELEC STIM OTHER THAN WOUND: HCPCS

## 2024-10-14 PROCEDURE — 97112 NEUROMUSCULAR REEDUCATION: CPT

## 2024-10-21 ENCOUNTER — APPOINTMENT (OUTPATIENT)
Facility: HOSPITAL | Age: 34
End: 2024-10-21
Payer: COMMERCIAL

## 2024-11-12 NOTE — PROGRESS NOTES
Neurology Note    Patient ID:  Mian Asher  [id number]  34 y.o.  1990      Date of Consultation:  November 13, 2024    Referring Physician: Dr. Lloyd    Reason for Consultation:  seizures      Assessment:  The patient is a 34-year-old with prior history of seizures who has been seizure-free for greater than 10 years on stable dose of levetiracetam.  Her prior neurologist have retired or left the area.  Her current neurological examination is normal.     Assessment & Plan  1. Epilepsy:  reportedly catamenial epilepsy  She had been followed in [facility] and then in [place].  MRI from 2008 was normal  Most recent EEGs from 2013 and 14 were normal  She has been been seizure-free for greater than 10 years.  Continues on current dose of Keppra 1500 mg XR nightly.  Side effects and toxicity of the medications reviewed  She did remain on Keppra during her first pregnancy and breast-feeding after having had discussions with her prior neurologist and her obstetrician  I discussed what is known about Keppra and pregnancy and discussed the risks and benefits  She is considering becoming pregnant again.  She would like to stay on her current dose of medication  If she becomes pregnant, will have levels checked periodically during pregnancy to ensure she remains therapeutic  Currently on prenatal vitamin  She does ask for DMV forms to be completed today.  Those were completed during her visit after being able to review all of her prior medical records.    Routine blood work for toxicity was performed in June 2024 and will need these checked annually    2. Diffuse erythematous rash:  Has visit with dermatology later this week.  3. Elevated LDL        Subjective: I used to have seizures       History of Present Illness:   Mian Asher is a 34 y.o. female who was referred to the neurology clinic at [facility] for an evaluation.    History of Present Illness  The patient presents for evaluation  10/1/2019      Jing Hernandez  [id number]  [address] [address]        Dear Gokul Bass    Thank you for choosing [facility] for your CT Calcium screening. Below is an explanation of the results as well as follow up recommendations from your screening. Findings: Your calcium score is 0. There is no identifiable coronary plaque. A heart healthy lifestyle is recommended. If you provided us with the name of your primary care physician or cardiologist, a copy of the enclosed results will be sent to them. If your score is abnormal, you may need further testing and treatment.     If you have any questions, please call our Heart  at,   [phone]       Sincerely,    [address]

## 2024-11-13 ENCOUNTER — CLINICAL DOCUMENTATION (OUTPATIENT)
Age: 34
End: 2024-11-13

## 2024-11-13 ENCOUNTER — OFFICE VISIT (OUTPATIENT)
Age: 34
End: 2024-11-13
Payer: COMMERCIAL

## 2024-11-13 VITALS
TEMPERATURE: 97.6 F | SYSTOLIC BLOOD PRESSURE: 118 MMHG | OXYGEN SATURATION: 99 % | WEIGHT: 209.8 LBS | HEART RATE: 93 BPM | RESPIRATION RATE: 16 BRPM | BODY MASS INDEX: 31.07 KG/M2 | DIASTOLIC BLOOD PRESSURE: 70 MMHG | HEIGHT: 69 IN

## 2024-11-13 DIAGNOSIS — R56.9 SEIZURES (HCC): Primary | ICD-10-CM

## 2024-11-13 PROCEDURE — 99205 OFFICE O/P NEW HI 60 MIN: CPT | Performed by: PSYCHIATRY & NEUROLOGY

## 2024-11-13 ASSESSMENT — PATIENT HEALTH QUESTIONNAIRE - PHQ9
SUM OF ALL RESPONSES TO PHQ QUESTIONS 1-9: 0
2. FEELING DOWN, DEPRESSED OR HOPELESS: NOT AT ALL
SUM OF ALL RESPONSES TO PHQ QUESTIONS 1-9: 0
SUM OF ALL RESPONSES TO PHQ9 QUESTIONS 1 & 2: 0
1. LITTLE INTEREST OR PLEASURE IN DOING THINGS: NOT AT ALL

## 2024-11-13 NOTE — PROGRESS NOTES
Faxed filled out and signed DMV paperwork to DMV at [phone] and received fax confirmation.  Placed in fast scan.    Pt received her copy at her office visit today.

## 2024-12-02 ENCOUNTER — HOSPITAL ENCOUNTER (OUTPATIENT)
Facility: HOSPITAL | Age: 34
Setting detail: RECURRING SERIES
Discharge: HOME | End: 2024-12-05
Payer: COMMERCIAL

## 2024-12-02 PROCEDURE — 97112 NEUROMUSCULAR REEDUCATION: CPT

## 2024-12-02 PROCEDURE — 97530 THERAPEUTIC ACTIVITIES: CPT

## 2024-12-02 NOTE — PROGRESS NOTES
PHYSICAL THERAPY - DAILY TREATMENT NOTE (updated 3/23)      Date: 2024          Patient Name:  Mian Asher :  1990   Medical   Diagnosis:  Pelvic floor dysfunction [M62.89] Treatment Diagnosis:  M62.89  OTHER SPECIFIED DISORDERS OF MUSCLE    Referral Source:  Mary Noel MD Insurance:   Payor: ALLIED BENEFIT SYSTEM / Plan: ALLIED BENEFIT SYSTEM / Product Type: *No Product type* /                     Patient  verified yes     Visit #   Current  / Total 10 12   Time   In / Out 1200 1245   Total Treatment Time 45   Total Timed Codes 45         SUBJECTIVE    Pain Level (0-10 scale): 0    Any medication changes, allergies to medications, adverse drug reactions, diagnosis change, or new procedure performed?: [x] No    [] Yes (see summary sheet for update)  Medications: Verified on Patient Summary List    Subjective functional status/changes:       Recently found out that she is pregnant, will confirm at appointment with Dr. Noel at the end of the month.  She reports that getting up from the floor is hard, some pubic pain, fatigue, and having more difficulty maintaining good posture     OBJECTIVE      Therapeutic Procedures:  Tx Min Billable or 1:1 Min (if diff from Tx Min) Procedure, Rationale, Specifics   30  15983 Neuromuscular Re-Education (timed):  improve balance, coordination, kinesthetic sense, posture, core stability and proprioception to improve patient's ability to develop conscious control of individual muscles and awareness of position of extremities in order to progress to PLOF and address remaining functional goals. (see flow sheet as applicable)    Details if applicable:     nt  04518 Manual Therapy (timed):  increase ROM, increase tissue extensibility, decrease trigger points, and increase postural awareness to improve patient's ability to progress to PLOF and address remaining functional goals.  The manual therapy interventions were performed at a separate and distinct time from the 
neuromuscular postural influences of functioning in ADL and IADL skills.        Frequency / Duration: Patient to be seen 1-2 times per week for 12 treatments.     Marco Watts, PT       12/2/2024       1:19 PM    If you have any questions/comments please contact us directly at [phone].   Thank you for allowing us to assist in the care of your patient.

## 2024-12-09 ENCOUNTER — HOSPITAL ENCOUNTER (OUTPATIENT)
Facility: HOSPITAL | Age: 34
Setting detail: RECURRING SERIES
Discharge: HOME | End: 2024-12-12
Payer: COMMERCIAL

## 2024-12-09 PROCEDURE — 97535 SELF CARE MNGMENT TRAINING: CPT

## 2024-12-09 PROCEDURE — 97112 NEUROMUSCULAR REEDUCATION: CPT

## 2024-12-09 NOTE — PROGRESS NOTES
PHYSICAL THERAPY - DAILY TREATMENT NOTE (updated 3/23)      Date: 2024          Patient Name:  Mian Asher :  1990   Medical   Diagnosis:  Pelvic floor dysfunction [M62.89] Treatment Diagnosis:  M62.89  OTHER SPECIFIED DISORDERS OF MUSCLE    Referral Source:  Mary Noel MD Insurance:   Payor: ALLIED BENEFIT SYSTEM / Plan: ALLIED BENEFIT SYSTEM / Product Type: *No Product type* /                     Patient  verified yes     Visit #   Current  / Total 11 12   Time   In / Out 1200 1245   Total Treatment Time 45   Total Timed Codes 45         SUBJECTIVE    Pain Level (0-10 scale): 0    Any medication changes, allergies to medications, adverse drug reactions, diagnosis change, or new procedure performed?: [x] No    [] Yes (see summary sheet for update)  Medications: Verified on Patient Summary List    Subjective functional status/changes:       Still having left low back pain and right hip pain with work outs.    OBJECTIVE      Therapeutic Procedures:  Tx Min Billable or 1:1 Min (if diff from Tx Min) Procedure, Rationale, Specifics   30  79818 Neuromuscular Re-Education (timed):  improve balance, coordination, kinesthetic sense, posture, core stability and proprioception to improve patient's ability to develop conscious control of individual muscles and awareness of position of extremities in order to progress to PLOF and address remaining functional goals. (see flow sheet as applicable)    Details if applicable:     nt  40295 Manual Therapy (timed):  increase ROM, increase tissue extensibility, decrease trigger points, and increase postural awareness to improve patient's ability to progress to PLOF and address remaining functional goals.  The manual therapy interventions were performed at a separate and distinct time from the therapeutic activities interventions . (see flow sheet as applicable)    Details if applicable:     15  29783 Self Care/Home Management (timed):  improve patient knowledge

## 2024-12-12 DIAGNOSIS — O36.80X0 ENCOUNTER TO DETERMINE FETAL VIABILITY OF PREGNANCY, SINGLE OR UNSPECIFIED FETUS: Primary | ICD-10-CM

## 2024-12-16 ENCOUNTER — HOSPITAL ENCOUNTER (OUTPATIENT)
Facility: HOSPITAL | Age: 34
Setting detail: RECURRING SERIES
Discharge: HOME | End: 2024-12-19
Payer: COMMERCIAL

## 2024-12-16 PROCEDURE — 97140 MANUAL THERAPY 1/> REGIONS: CPT

## 2024-12-16 PROCEDURE — 97112 NEUROMUSCULAR REEDUCATION: CPT

## 2024-12-16 NOTE — PROGRESS NOTES
PHYSICAL THERAPY - DAILY TREATMENT NOTE (updated 3/23)      Date: 2024          Patient Name:  Mian Asher :  1990   Medical   Diagnosis:  Pelvic floor dysfunction [M62.89] Treatment Diagnosis:  M62.89  OTHER SPECIFIED DISORDERS OF MUSCLE    Referral Source:  Mary Noel MD Insurance:   Payor: ALLIED BENEFIT SYSTEM / Plan: ALLIED BENEFIT SYSTEM / Product Type: *No Product type* /                     Patient  verified yes     Visit #   Current  / Total 12 12   Time   In / Out 1200 1250   Total Treatment Time 50   Total Timed Codes 50         SUBJECTIVE    Pain Level (0-10 scale): 0    Any medication changes, allergies to medications, adverse drug reactions, diagnosis change, or new procedure performed?: [x] No    [] Yes (see summary sheet for update)  Medications: Verified on Patient Summary List    Subjective functional status/changes:       Feeling less stiff overall.  Having more trouble getting comfortable while sleeping.    OBJECTIVE      Therapeutic Procedures:  Tx Min Billable or 1:1 Min (if diff from Tx Min) Procedure, Rationale, Specifics   15  25185 Manual Therapy (timed):  increase ROM, increase tissue extensibility, decrease trigger points, and increase postural awareness to improve patient's ability to progress to PLOF and address remaining functional goals.  The manual therapy interventions were performed at a separate and distinct time from the therapeutic activities interventions . (see flow sheet as applicable)    Details if applicable:     25  69628 Neuromuscular Re-Education (timed):  improve balance, coordination, kinesthetic sense, posture, core stability and proprioception to improve patient's ability to develop conscious control of individual muscles and awareness of position of extremities in order to progress to PLOF and address remaining functional goals. (see flow sheet as applicable)    Details if applicable:      40 40    Total Total      Modalities Rationale:

## 2024-12-23 ENCOUNTER — APPOINTMENT (OUTPATIENT)
Facility: HOSPITAL | Age: 34
End: 2024-12-23
Payer: COMMERCIAL

## 2024-12-24 ENCOUNTER — HOSPITAL ENCOUNTER (OUTPATIENT)
Facility: HOSPITAL | Age: 34
Setting detail: RECURRING SERIES
Discharge: HOME | End: 2024-12-27
Payer: COMMERCIAL

## 2024-12-24 PROCEDURE — 97112 NEUROMUSCULAR REEDUCATION: CPT

## 2024-12-24 PROCEDURE — 97140 MANUAL THERAPY 1/> REGIONS: CPT

## 2024-12-24 NOTE — PROGRESS NOTES
Initial Prenatal Note    Mian Asher is a 34 y.o. female presents for a new pregnancy visit.    Chief Complaint   Patient presents with    Amenorrhea    Initial Prenatal Visit       LMP history:  Patient's last menstrual period was 10/24/2024.  Her last menstrual period was Regular every month without spotting, flow is moderate, and usually lasting less than 6 days.  A urine pregnancy test was positive 4 weeks ago. She was not on the pill at conception.     Based on her LMP, her EGA is 9 weeks 0 days and her EGA is 25.      Ultrasound data:  She had an ultrasound done by the ultrasound tech today which revealed a viable adan pregnancy with a gestational age of 8 weeks and 5 days giving an EDC of 25.    Pregnancy symptoms:  Since her LMP she has experienced breast tenderness  and nausea  She has not been vomiting over the last few weeks.  Associated signs and symptoms which she denies: dysuria, discharge, vaginal bleeding.    She states she has not gained weight:      Relevant past pregnancy history:   - LTCS x 1  She does not history of  delivery.      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   Her partner's name is Ger - Domestic/  at [employer].  Current children: Mary Eugene       1. Have you been to the ER, urgent care clinic, or hospitalized since your last visit? No  2. Have you seen or consulted any other health care providers outside of the [facility] since your last visit? No      She declines  a chaperone during the gynecologic exam today.

## 2024-12-26 ENCOUNTER — INITIAL PRENATAL (OUTPATIENT)
Age: 34
End: 2024-12-26

## 2024-12-26 VITALS
RESPIRATION RATE: 16 BRPM | WEIGHT: 214.2 LBS | HEART RATE: 67 BPM | DIASTOLIC BLOOD PRESSURE: 76 MMHG | BODY MASS INDEX: 31.63 KG/M2 | SYSTOLIC BLOOD PRESSURE: 118 MMHG | TEMPERATURE: 98.3 F | OXYGEN SATURATION: 98 %

## 2024-12-26 DIAGNOSIS — Z34.90 PREGNANCY, UNSPECIFIED GESTATIONAL AGE: Primary | ICD-10-CM

## 2024-12-26 DIAGNOSIS — Z34.01 ENCOUNTER FOR SUPERVISION OF NORMAL FIRST PREGNANCY IN FIRST TRIMESTER: ICD-10-CM

## 2024-12-26 PROBLEM — O14.93 PRE-ECLAMPSIA IN THIRD TRIMESTER: Status: RESOLVED | Noted: 2023-10-27 | Resolved: 2024-12-26

## 2024-12-26 PROCEDURE — 0500F INITIAL PRENATAL CARE VISIT: CPT | Performed by: OBSTETRICS & GYNECOLOGY

## 2024-12-26 SDOH — ECONOMIC STABILITY: FOOD INSECURITY: WITHIN THE PAST 12 MONTHS, THE FOOD YOU BOUGHT JUST DIDN'T LAST AND YOU DIDN'T HAVE MONEY TO GET MORE.: NEVER TRUE

## 2024-12-26 SDOH — ECONOMIC STABILITY: FOOD INSECURITY: WITHIN THE PAST 12 MONTHS, YOU WORRIED THAT YOUR FOOD WOULD RUN OUT BEFORE YOU GOT MONEY TO BUY MORE.: NEVER TRUE

## 2024-12-26 SDOH — ECONOMIC STABILITY: INCOME INSECURITY: HOW HARD IS IT FOR YOU TO PAY FOR THE VERY BASICS LIKE FOOD, HOUSING, MEDICAL CARE, AND HEATING?: NOT HARD AT ALL

## 2024-12-26 NOTE — PROGRESS NOTES
Initial Prenatal Visit    CC: Amenorrhea, positive pregnancy test    HPI:  Mian Asher is a 34 y.o.  who presents for initial prenatal appointment. Since her LMP she has experienced breast tenderness.  She denies dysuria, discharge, vaginal bleeding.    LMP history:  The date of her LMP is 10/24/2024 .  Her last menstrual period was normal and her LMP is certain.       Ultrasound data:  She had an ultrasound performed today in the office which revealed a viable adan pregnancy.    2024  FINDINGS:  A single, viable IUP is seen with normal cardiac rate and rhythm.    CRL: 2.11 cm  FHR: 175 bpm  Yolk Sac: seen     Right adnexa appears normal. Right ovary appears normal.  Left adnexa appears normal. Left ovary appears normal     IMPRESSION:  A single viable intrauterine pregnancy is identified with a normal cardiac rate. Final EDC of 2025 is based on ACOG guidelines using her LMP dating, which is consistent with measurements from today's ultrasound.      She is dated based off her LMP with a gestational age of 9w0d giving an EDC of 2025.    Past pregnancy history:  OB History    Para Term  AB Living   2 1 1 0 0 1   SAB IAB Ectopic Molar Multiple Live Births           0 1      # Outcome Date GA Lbr Chris/2nd Weight Sex Type Anes PTL Lv   2 Current            1 Term 10/27/23 41w0d  3.73 kg (8 lb 3.6 oz) F CS-LTranv EPI N JOHAN        _ _ _ _ _ _ _ _ _ _ _ _ _ _ _ _ _ _ _ _ _ _ _ _ _ _ _ _ _ _ _ _     Substance history: Negative for alcohol, tobacco and street drugs.           Positive for nothing.  Exposure history:   There are not indoor cats in the home.  The patient was instructed to not change the cat litter.   She admits close contact with children on a regular basis.   She has had chicken pox or the vaccine in the past.   Patient denies issues with domestic violence.     Genetic Screening/Teratology Counseling: (Includes patient, baby's father, or anyone in either family

## 2025-01-16 ENCOUNTER — ROUTINE PRENATAL (OUTPATIENT)
Age: 35
End: 2025-01-16

## 2025-01-16 VITALS
OXYGEN SATURATION: 96 % | WEIGHT: 215.2 LBS | HEART RATE: 76 BPM | BODY MASS INDEX: 31.78 KG/M2 | DIASTOLIC BLOOD PRESSURE: 78 MMHG | TEMPERATURE: 97.7 F | RESPIRATION RATE: 16 BRPM | SYSTOLIC BLOOD PRESSURE: 124 MMHG

## 2025-01-16 DIAGNOSIS — Z34.90 PREGNANCY, UNSPECIFIED GESTATIONAL AGE: Primary | ICD-10-CM

## 2025-01-16 PROCEDURE — 0502F SUBSEQUENT PRENATAL CARE: CPT | Performed by: OBSTETRICS & GYNECOLOGY

## 2025-01-16 RX ORDER — ASPIRIN 81 MG/1
81 TABLET, CHEWABLE ORAL DAILY
COMMUNITY

## 2025-01-16 RX ORDER — FOLIC ACID 1 MG/1
1 TABLET ORAL DAILY
COMMUNITY

## 2025-01-16 SDOH — ECONOMIC STABILITY: FOOD INSECURITY: WITHIN THE PAST 12 MONTHS, THE FOOD YOU BOUGHT JUST DIDN'T LAST AND YOU DIDN'T HAVE MONEY TO GET MORE.: NEVER TRUE

## 2025-01-16 SDOH — ECONOMIC STABILITY: FOOD INSECURITY: WITHIN THE PAST 12 MONTHS, YOU WORRIED THAT YOUR FOOD WOULD RUN OUT BEFORE YOU GOT MONEY TO BUY MORE.: NEVER TRUE

## 2025-01-16 ASSESSMENT — PATIENT HEALTH QUESTIONNAIRE - PHQ9
SUM OF ALL RESPONSES TO PHQ QUESTIONS 1-9: 10
10. IF YOU CHECKED OFF ANY PROBLEMS, HOW DIFFICULT HAVE THESE PROBLEMS MADE IT FOR YOU TO DO YOUR WORK, TAKE CARE OF THINGS AT HOME, OR GET ALONG WITH OTHER PEOPLE: SOMEWHAT DIFFICULT
3. TROUBLE FALLING OR STAYING ASLEEP: MORE THAN HALF THE DAYS
8. MOVING OR SPEAKING SO SLOWLY THAT OTHER PEOPLE COULD HAVE NOTICED. OR THE OPPOSITE, BEING SO FIGETY OR RESTLESS THAT YOU HAVE BEEN MOVING AROUND A LOT MORE THAN USUAL: NOT AT ALL
6. FEELING BAD ABOUT YOURSELF - OR THAT YOU ARE A FAILURE OR HAVE LET YOURSELF OR YOUR FAMILY DOWN: SEVERAL DAYS
2. FEELING DOWN, DEPRESSED OR HOPELESS: NOT AT ALL
7. TROUBLE CONCENTRATING ON THINGS, SUCH AS READING THE NEWSPAPER OR WATCHING TELEVISION: NEARLY EVERY DAY
SUM OF ALL RESPONSES TO PHQ9 QUESTIONS 1 & 2: 0
5. POOR APPETITE OR OVEREATING: SEVERAL DAYS
9. THOUGHTS THAT YOU WOULD BE BETTER OFF DEAD, OR OF HURTING YOURSELF: NOT AT ALL
SUM OF ALL RESPONSES TO PHQ QUESTIONS 1-9: 10
4. FEELING TIRED OR HAVING LITTLE ENERGY: NEARLY EVERY DAY
1. LITTLE INTEREST OR PLEASURE IN DOING THINGS: NOT AT ALL

## 2025-01-16 NOTE — PROGRESS NOTES
Subjective: Doing well. No FM yet. Denies VB, LOF. Earache is still present- feels like it is congestion. Does have a history of allergies    /78 (Site: Right Upper Arm, Position: Sitting, Cuff Size: Large Adult)   Pulse 76   Temp 97.7 °F (36.5 °C) (Oral)   Resp 16   Wt 97.6 kg (215 lb 3.2 oz)   LMP 10/24/2024   SpO2 96%   BMI 31.78 kg/m²     Prenatal Fetal Information  Fetal HR: VScan  Movement: Absent  FHTs 160s    A/P  Mian Asher is a 34 y.o.  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 On Keppra 1500 mg nightly  - Folic acid 1 mg daily  - 3T growth scan, BPPs in 3T    History of prior c/s due to arrest at 7 cm, 8 # 3 oz  - Short interval pregnancy (13 months from delivery to conception)  - Desires repeat at 39 weeks, TOLAC if spontaneously labors prior    History of pre-eclampsia  - Baby aspirin  - Baseline pre-E labs: UPC     AMA  - Baby aspirin  - BPPs in 3T    Routine OB:  - PNLs:  - Genetic Screening: Desires NIPT  - Anatomy scan:   - Vaccines: [ ]  Flu [ ] Tdap  - 1 hr GTT:   - Third Tri Labs:   - GBS:   - Fetal presentation:    Pain mgmt. 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 Daughter Mary Eugene born 2023!            Mary Noel MD

## 2025-01-17 LAB
BACTERIA SPEC CULT: NORMAL
SERVICE CMNT-IMP: NORMAL
T PALLIDUM AB SER QL IA: NON REACTIVE

## 2025-01-18 LAB — VZV IGG SER IA-ACNC: REACTIVE

## 2025-01-19 LAB
ALBUMIN SERPL-MCNC: 3.7 G/DL (ref 3.5–5)
ALBUMIN/GLOB SERPL: 1.1 (ref 1.1–2.2)
ALP SERPL-CCNC: 79 U/L (ref 45–117)
ALT SERPL-CCNC: 13 U/L (ref 12–78)
ANION GAP SERPL CALC-SCNC: 9 MMOL/L (ref 2–12)
AST SERPL-CCNC: 6 U/L (ref 15–37)
BILIRUB SERPL-MCNC: 0.3 MG/DL (ref 0.2–1)
BUN SERPL-MCNC: 9 MG/DL (ref 6–20)
BUN/CREAT SERPL: 14 (ref 12–20)
CALCIUM SERPL-MCNC: 9.6 MG/DL (ref 8.5–10.1)
CHLORIDE SERPL-SCNC: 103 MMOL/L (ref 97–108)
CO2 SERPL-SCNC: 24 MMOL/L (ref 21–32)
CREAT SERPL-MCNC: 0.66 MG/DL (ref 0.55–1.02)
CREAT UR-MCNC: 203 MG/DL
ERYTHROCYTE [DISTWIDTH] IN BLOOD BY AUTOMATED COUNT: 12 % (ref 11.5–14.5)
GLOBULIN SER CALC-MCNC: 3.3 G/DL (ref 2–4)
GLUCOSE SERPL-MCNC: 78 MG/DL (ref 65–100)
HBV SURFACE AG SER QL: 0.54 INDEX
HBV SURFACE AG SER QL: NEGATIVE
HCT VFR BLD AUTO: 36.6 % (ref 35–47)
HCV AB SER IA-ACNC: 0.18 INDEX
HCV AB SERPL QL IA: NONREACTIVE
HGB BLD-MCNC: 12.2 G/DL (ref 11.5–16)
HIV 1+2 AB+HIV1 P24 AG SERPL QL IA: NONREACTIVE
HIV 1/2 RESULT COMMENT: NORMAL
MCH RBC QN AUTO: 30.5 PG (ref 26–34)
MCHC RBC AUTO-ENTMCNC: 33.3 G/DL (ref 30–36.5)
MCV RBC AUTO: 91.5 FL (ref 80–99)
NRBC # BLD: 0 K/UL (ref 0–0.01)
NRBC BLD-RTO: 0 PER 100 WBC
PLATELET # BLD AUTO: 260 K/UL (ref 150–400)
PMV BLD AUTO: 10.1 FL (ref 8.9–12.9)
POTASSIUM SERPL-SCNC: 3.9 MMOL/L (ref 3.5–5.1)
PROT SERPL-MCNC: 7 G/DL (ref 6.4–8.2)
PROT UR-MCNC: 15 MG/DL (ref 0–11.9)
PROT/CREAT UR-RTO: 0.1
RBC # BLD AUTO: 4 M/UL (ref 3.8–5.2)
RUBV IGG SERPL IA-ACNC: NORMAL IU/ML
SODIUM SERPL-SCNC: 136 MMOL/L (ref 136–145)
WBC # BLD AUTO: 9.7 K/UL (ref 3.6–11)

## 2025-01-20 ENCOUNTER — HOSPITAL ENCOUNTER (OUTPATIENT)
Facility: HOSPITAL | Age: 35
Setting detail: RECURRING SERIES
Discharge: HOME | End: 2025-01-23
Payer: COMMERCIAL

## 2025-01-20 LAB
C TRACH RRNA SPEC QL NAA+PROBE: NEGATIVE
N GONORRHOEA RRNA SPEC QL NAA+PROBE: NEGATIVE
SPECIMEN SOURCE: NORMAL
T VAGINALIS RRNA SPEC QL NAA+PROBE: NEGATIVE

## 2025-01-20 PROCEDURE — 97140 MANUAL THERAPY 1/> REGIONS: CPT

## 2025-01-20 PROCEDURE — 97112 NEUROMUSCULAR REEDUCATION: CPT

## 2025-01-20 NOTE — PROGRESS NOTES
PHYSICAL THERAPY - DAILY TREATMENT NOTE (updated 3/23)      Date: 2025          Patient Name:  Mian Asher :  1990   Medical   Diagnosis:  Pelvic floor dysfunction [M62.89] Treatment Diagnosis:  M62.89  OTHER SPECIFIED DISORDERS OF MUSCLE    Referral Source:  Mary Noel MD Insurance:   Payor: ALLIED BENEFIT SYSTEM / Plan: ALLIED BENEFIT SYSTEM / Product Type: *No Product type* /                     Patient  verified yes     Visit #   Current  / Total 14 12   Time   In / Out 1205 1250   Total Treatment Time 45   Total Timed Codes 45         SUBJECTIVE    Pain Level (0-10 scale): 0    Any medication changes, allergies to medications, adverse drug reactions, diagnosis change, or new procedure performed?: [x] No    [] Yes (see summary sheet for update)  Medications: Verified on Patient Summary List    Subjective functional status/changes:       Patient reports that she has had 2 appointments for her pregnancy.  She is currently 13 weeks pregnant.  Noticing that she is experiencing some right sided pain above her incision.  Will increase with transitional movements (supine or sit<>stand).    OBJECTIVE      Therapeutic Procedures:  Tx Min Billable or 1:1 Min (if diff from Tx Min) Procedure, Rationale, Specifics   15  75582 Manual Therapy (timed):  increase ROM, increase tissue extensibility, decrease trigger points, and increase postural awareness to improve patient's ability to progress to PLOF and address remaining functional goals.  The manual therapy interventions were performed at a separate and distinct time from the therapeutic activities interventions . (see flow sheet as applicable)    Details if applicable:  scar ,mobility, k-taping   30  68715 Neuromuscular Re-Education (timed):  improve balance, coordination, kinesthetic sense, posture, core stability and proprioception to improve patient's ability to develop conscious control of individual muscles and awareness of position of

## 2025-01-27 ENCOUNTER — APPOINTMENT (OUTPATIENT)
Facility: HOSPITAL | Age: 35
End: 2025-01-27
Payer: COMMERCIAL

## 2025-01-27 ENCOUNTER — TELEPHONE (OUTPATIENT)
Age: 35
End: 2025-01-27

## 2025-01-27 DIAGNOSIS — O28.5 ABNORMAL GENETIC TEST IN PREGNANCY: Primary | ICD-10-CM

## 2025-01-27 DIAGNOSIS — Z34.90 PREGNANCY, UNSPECIFIED GESTATIONAL AGE: ICD-10-CM

## 2025-01-27 LAB
Lab: ABNORMAL
NTRA FETAL FRACTION: ABNORMAL
NTRA FETAL RHD SUMMARY: ABNORMAL
NTRA GENDER OF FETUS: ABNORMAL
NTRA MONOSOMY X AGE-BASED RISK TEXT: ABNORMAL
NTRA MONOSOMY X RESULT TEXT: ABNORMAL
NTRA MONOSOMY X RISK SCORE TEXT: ABNORMAL
NTRA TRIPLOIDY RESULT TEXT: ABNORMAL
NTRA TRISOMY 13 AGE-BASED RISK TEXT: ABNORMAL
NTRA TRISOMY 13 RESULT TEXT: ABNORMAL
NTRA TRISOMY 13 RISK SCORE TEXT: ABNORMAL
NTRA TRISOMY 18 AGE-BASED RISK TEXT: ABNORMAL
NTRA TRISOMY 18 RESULT TEXT: ABNORMAL
NTRA TRISOMY 18 RISK SCORE TEXT: ABNORMAL
NTRA TRISOMY 21 AGE-BASED RISK TEXT: ABNORMAL
NTRA TRISOMY 21 RESULT TEXT: ABNORMAL
NTRA TRISOMY 21 RISK SCORE TEXT: ABNORMAL

## 2025-01-27 NOTE — TELEPHONE ENCOUNTER
Discussed results with patient. She is aware of atypical sex chromosome finding. We discussed options including following up with genetic counseling prior to proceeding with diagnostic testing.    Referral placed to genetic counseling. Also provided information on [facility] via inbox message.    Mary Noel MD

## 2025-01-28 LAB
HGB A MFR BLD: 97.3 % (ref 96.4–98.8)
HGB A2 MFR BLD COLUMN CHROM: 2.7 % (ref 1.8–3.2)
HGB F MFR BLD: 0 % (ref 0–2)
HGB FRACT BLD-IMP: NORMAL
HGB S MFR BLD: 0 %

## 2025-01-30 ENCOUNTER — TELEMEDICINE (OUTPATIENT)
Age: 35
End: 2025-01-30

## 2025-01-30 DIAGNOSIS — O28.5 ABNORMAL CHROMOSOMAL AND GENETIC FINDING ON ANTENATAL SCREENING OF MOTHER: Primary | ICD-10-CM

## 2025-01-30 DIAGNOSIS — Z3A.14 14 WEEKS GESTATION OF PREGNANCY: ICD-10-CM

## 2025-01-30 DIAGNOSIS — O09.522 SUPERVISION OF ELDERLY MULTIGRAVIDA IN SECOND TRIMESTER: ICD-10-CM

## 2025-01-30 NOTE — PROGRESS NOTES
Mian Asher is a 34 y.o. female seen on 01/30/25 in our [place] office for genetic counseling regarding her atypical NIPT results. Mian Asher will be 34 at the Estimated Date of Delivery: 7/31/25. Genetic counseling was performed via Telemedicine today. The patient was accompanied to her appointment by her partner and father of the baby (FOB), Ger.    Impression and Recommendations:    - The patient's NIPT results showed an atypical finding involving the X chromosome.   - The likely fetal/placental nature of this finding and subsequent available  genetic screening and testing were reviewed.   - The finding appeared to be mosaic, and thus this topic was reviewed in depth.  - At this time the patient ELECTED to proceed with diagnostic amniocentesis once she is 16 weeks gestation.  - FISH, karyotype, chromosomal microarray and amniotic fluid AFP will all be performed at that time.  - If amniocentesis is not performed, an msAFP is recommended to screen for open neural tube defects in the current pregnancy.   - A follow up genetic counseling visit is scheduled for 2/17/25.  - An ultrasound, MFM consult and amniocentesis are scheduled for 2/18/25.    The following information was discussed with the patient today:    Mian had non-invasive prenatal testing (NIPT) performed by [facility] through her OB's office, with results indicating an atypical finding involving the X chromosome. An atypical finding is an unvalidated finding outside the scope of the test; it can include, but is not limited to, fetal and/or maternal mosaicism, fetal and/or maternal chromosome abnormality, or normal variation. For this patient, the finding is suspected to be of fetal/placental origin and it appears to be mosaic. Of note, fetal risk assessment for monosomy X was not performed. Repeat NIPT is not recommended. Additionally, the patient's NIPT results were negative, or normal, for Down syndrome, trisomy 18, trisomy 13 and

## 2025-02-03 ENCOUNTER — APPOINTMENT (OUTPATIENT)
Facility: HOSPITAL | Age: 35
End: 2025-02-03
Payer: COMMERCIAL

## 2025-02-10 ENCOUNTER — TELEPHONE (OUTPATIENT)
Age: 35
End: 2025-02-10

## 2025-02-10 NOTE — TELEPHONE ENCOUNTER
I spoke to the patient, Mian Asher, today over the phone to answer questions she had regarding Armstrong syndrome.    The patient's primary question today was regarding the significantly increased risk for pregnancy loss in fetuses with Armstrong syndrome. I reviewed with the patient that while there is no official time after which the risk for pregnancy loss disappears, there are other factors that typically play a role in the risk for pregnancy loss with Armstrong syndrome, including fetal ultrasound (fetal hydrops significantly increases risk for miscarriage) and gestational age (even in chromosomally normal pregnancies, miscarriage risk is higher earlier in pregnancy).    Briefly reviewed upcoming amniocentesis, including answering her question about transportation. Clarified that she should be able to drive herself to and from the appointment if she needs or wants to.    Patient aware of follow up GC visit on 2/17 and amniocentesis visit on 2/18.    The patient verbalized her understanding of the information as it was presented to her today; she denies any further questions or concerns at this time.    Tiffanie Deshpande MS, [facility]  Licensed, Certified Genetic Counselor

## 2025-02-11 NOTE — PROGRESS NOTES
Mian Asher is a 34 y.o.  at 16w0d    Patient states she is doing well.    Patient reports Negative FM.      1. Have you been to the ER, urgent care clinic, or hospitalized since your last visit? No  2. Have you seen or consulted any other health care providers outside of the [facility] since your last visit? No      She declines  a chaperone during the gynecologic exam today.

## 2025-02-12 NOTE — PROGRESS NOTES
Subjective: Doing well. No FM yet. Denies VB, LOF.    /76 (Site: Right Upper Arm, Position: Sitting, Cuff Size: Large Adult)   Pulse 85   Temp 98.7 °F (37.1 °C) (Oral)   Resp 16   Wt 95.4 kg (210 lb 6.4 oz)   LMP 10/24/2024   SpO2 98%   BMI 31.07 kg/m²     Prenatal Fetal Information  Fetal HR: 160  Movement: Absent    A/P  Mian Asher is a 34 y.o.  at 16w0d with pregnancy complicated by:   - NIPT with abnormal- suspected monosomy X  - Follows with genetic counseling. Follow up appointments on -  - Increased stress currently. She and Ger are working on selling their house and moving!    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 On Keppra 1500 mg nightly  - Folic acid 1 mg daily  - 3T growth scan, BPPs in 3T    History of prior c/s due to arrest at 7 cm, 8 # 3 oz  - Short interval pregnancy (13 months from delivery to conception)  - Desires repeat at 39 weeks, TOLAC if spontaneously labors prior    History of pre-eclampsia  - Baby aspirin  - Baseline pre-E labs: UPC 0.1, AST 6/ALT 13, Cr 0.66, Plt 260, 12.2/36.6    AMA  - Baby aspirin  - BPPs in 3T    Routine OB:  - PNLs: A+/absc neg, PNLs normal. G/C/T neg Urine cx NG  - Genetic Screening: Atypical sex finding, Girl, Referred to [facility]  - Anatomy scan:   - Vaccines: [ ]  Flu [ ] Tdap  - 1 hr GTT:   - Third Tri Labs:   - GBS:   - Fetal presentation:    Pain mgmt. 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 Daughter Mary Eugene born 2023!        Mary Noel MD

## 2025-02-13 ENCOUNTER — ROUTINE PRENATAL (OUTPATIENT)
Age: 35
End: 2025-02-13

## 2025-02-13 VITALS
OXYGEN SATURATION: 98 % | HEART RATE: 85 BPM | RESPIRATION RATE: 16 BRPM | DIASTOLIC BLOOD PRESSURE: 76 MMHG | SYSTOLIC BLOOD PRESSURE: 122 MMHG | BODY MASS INDEX: 31.07 KG/M2 | WEIGHT: 210.4 LBS | TEMPERATURE: 98.7 F

## 2025-02-13 DIAGNOSIS — Z34.90 PREGNANCY, UNSPECIFIED GESTATIONAL AGE: Primary | ICD-10-CM

## 2025-02-13 PROCEDURE — 0502F SUBSEQUENT PRENATAL CARE: CPT | Performed by: OBSTETRICS & GYNECOLOGY

## 2025-02-17 ENCOUNTER — TELEMEDICINE (OUTPATIENT)
Age: 35
End: 2025-02-17
Payer: COMMERCIAL

## 2025-02-17 DIAGNOSIS — O09.522 SUPERVISION OF ELDERLY MULTIGRAVIDA IN SECOND TRIMESTER: ICD-10-CM

## 2025-02-17 DIAGNOSIS — O28.5 ABNORMAL CHROMOSOMAL AND GENETIC FINDING ON ANTENATAL SCREENING OF MOTHER: Primary | ICD-10-CM

## 2025-02-17 DIAGNOSIS — Z3A.16 16 WEEKS GESTATION OF PREGNANCY: ICD-10-CM

## 2025-02-17 PROCEDURE — 96041 GENETIC COUNSELING SVC EA 30: CPT | Performed by: COUNSELOR

## 2025-02-17 NOTE — PROGRESS NOTES
Mian Asher is a 34 y.o. female seen on 02/17/25 in our [place] office for follow up genetic counseling regarding tomorrow's scheduled amniocentesis. Main Asher will be 34 at the Estimated Date of Delivery: 7/31/25. Genetic counseling was performed via Telemedicine today. The patient was accompanied to her appointment by her partner and father of the baby (FOB), Ger.    Impressions and Recommendations:    - The patient's NIPT results showed an atypical, likely mosaic, finding involving the X chromosome, which was reviewed in depth at the patient's previous genetic counseling appointment.  - The patient has ELECTED to proceed with amniocentesis.  - FISH, karyotype, chromosomal microarray, amniotic fluid AFP and maternal cell contamination studies will all be performed.  - An ultrasound, MFM consult and amniocentesis are scheduled for tomorrow, 2/18/25.     The following information was discussed with the patient at her initial genetic counseling appointment and was reviewed again today:     Mian had non-invasive prenatal testing (NIPT) performed by [facility] through her OB's office, with results indicating an atypical finding involving the X chromosome. An atypical finding is an unvalidated finding outside the scope of the test; it can include, but is not limited to, fetal and/or maternal mosaicism, fetal and/or maternal chromosome abnormality, or normal variation. For this patient, the finding is suspected to be of fetal/placental origin and it appears to be mosaic. Of note, fetal risk assessment for monosomy X was not performed. Repeat NIPT is not recommended. Additionally, the patient's NIPT results were negative, or normal, for Down syndrome, trisomy 18, trisomy 13 and triploidy, indicating a <1 in 10,000 risk for these conditions in the current pregnancy.      The lab was able to specify that the atypical finding involved the X chromosome and that sex is consistent with female, so previous

## 2025-02-18 ENCOUNTER — ROUTINE PRENATAL (OUTPATIENT)
Age: 35
End: 2025-02-18
Payer: COMMERCIAL

## 2025-02-18 VITALS — SYSTOLIC BLOOD PRESSURE: 115 MMHG | HEART RATE: 63 BPM | DIASTOLIC BLOOD PRESSURE: 78 MMHG

## 2025-02-18 DIAGNOSIS — O28.5 ABNORMAL CHROMOSOMAL AND GENETIC FINDING ON ANTENATAL SCREENING OF MOTHER: Primary | ICD-10-CM

## 2025-02-18 DIAGNOSIS — O28.5 ABNORMAL CHROMOSOMAL AND GENETIC FINDING ON ANTENATAL SCREENING OF MOTHER: ICD-10-CM

## 2025-02-18 DIAGNOSIS — O09.522 SUPERVISION OF ELDERLY MULTIGRAVIDA IN SECOND TRIMESTER: ICD-10-CM

## 2025-02-18 DIAGNOSIS — Z3A.16 16 WEEKS GESTATION OF PREGNANCY: ICD-10-CM

## 2025-02-18 PROCEDURE — 99204 OFFICE O/P NEW MOD 45 MIN: CPT | Performed by: STUDENT IN AN ORGANIZED HEALTH CARE EDUCATION/TRAINING PROGRAM

## 2025-02-18 PROCEDURE — 76946 ECHO GUIDE FOR AMNIOCENTESIS: CPT | Performed by: STUDENT IN AN ORGANIZED HEALTH CARE EDUCATION/TRAINING PROGRAM

## 2025-02-18 PROCEDURE — 76805 OB US >/= 14 WKS SNGL FETUS: CPT | Performed by: STUDENT IN AN ORGANIZED HEALTH CARE EDUCATION/TRAINING PROGRAM

## 2025-02-18 PROCEDURE — 59000 AMNIOCENTESIS DIAGNOSTIC: CPT | Performed by: STUDENT IN AN ORGANIZED HEALTH CARE EDUCATION/TRAINING PROGRAM

## 2025-02-18 RX ORDER — FERROUS SULFATE 325(65) MG
325 TABLET, DELAYED RELEASE (ENTERIC COATED) ORAL
COMMUNITY

## 2025-02-18 RX ORDER — CETIRIZINE HYDROCHLORIDE 5 MG/1
5 TABLET ORAL DAILY
COMMUNITY

## 2025-02-18 NOTE — PROGRESS NOTES
Patient was seen 2/18/2025      Please look under media to view full consult and ultrasound report in [facility].         Vidya Hardin MD   Maternal Fetal Medicine Patient was seen 2/18/2025      Please look under media to view full consult and ultrasound report in [facility].         Vidya Hardin MD   Maternal Fetal Medicine

## 2025-02-18 NOTE — PROGRESS NOTES
Pre, intra, and post amnio instructions verbal and written provided to the patient. All questions were answered.

## 2025-02-18 NOTE — PROCEDURES
PATIENT: JONATHON OBREGNO   -  : 1990   -  DOS:2025   -  INTERPRETING PROVIDER:Vidya Hardin,   Indication  ========    Abnormal NIPT    Method  ======    Transabdominal ultrasound examination, Ultrasound-guided amniocentesis. View: suboptimal due to early gestational age    Dating  ======    LMP on: 10/24/2024  GA by LMP 16 w + 5 d  SADIA by LMP: 2025  Ultrasound examination on: 2025  GA by U/S based upon: AC, BPD, Femur, HC  GA by U/S 16 w + 1 d  SADIA by U/S: 2025  Assigned: based on the LMP, selected on 2025  Assigned GA 16 w + 5 d  Assigned SADIA: 2025    Fetal Growth Overview  =================    Exam date        GA              BPD (mm)          HC (mm)            AC (mm)               FL (mm)             HL (mm)          EFW (g)  2025        16w 5d        31.8     15%        116.1    4%        106.4     45%        21.7    36%        22     58%        156    26%    Fetal Biometry  ============    Standard  BPD 31.8 mm 16w 0d 15% Hadlock  OFD 41.0 mm 16w 2d 33% Zuleyka  .1 mm 15w 5d 4% Hadlock  .4 mm 16w 4d 45% Hadlock  Femur 21.7 mm 16w 3d 36% Hadlock  Humerus 22.0 mm 16w 5d 58% Zuleyka   g 16w 2d 26% Hadlock  EFW (lb) 0 lb  EFW (oz) 6 oz  EFW by: Hadlock (BPD-HC-AC-FL)  Other Structures   bpm    General Evaluation  ==============    Cardiac activity present.  bpm. Fetal movements: visualized, visualized. Presentation: cephalic, maternal left  Placenta: Placental site: Anterior. Placental edge-to-cervical os distance 4.5 cm  Umbilical cord: Cord vessels: 3 vessel cord, 3 vessel cord. Insertion site: central, central  Amniotic fluid: Amount of AF: normal. MVP 2.7 cm    Fetal Anatomy  ===========    Cranium: normal  Lateral ventricles: normal  Choroid plexus: normal  Midline falx: normal  Cavum septi pellucidi: NOT VISUALIZED  Cerebellum: NOT VISUALIZED  Cisterna magna: NOT VISUALIZED  Lips: NOT VISUALIZED  Profile: NOT VISUALIZED  Nose: NOT

## 2025-02-18 NOTE — PROGRESS NOTES
Mian Asher is a 34 y.o. female    Chief Complaint   Patient presents with    Pregnancy Ultrasound       /78   Pulse 63   LMP 10/24/2024         1. Have you been to the ER, urgent care clinic since your last visit?  Hospitalized since your last visit? No    2. Have you seen or consulted any other health care providers outside of the [facility] since your last visit?  Include any pap smears or colon screening. No    Learning Assessment:       No data to display                Fall Risk Assessment:       No data to display                Abuse Screening:       No data to display                ADL Screening:       No data to display

## 2025-02-19 ENCOUNTER — HOSPITAL ENCOUNTER (OUTPATIENT)
Facility: HOSPITAL | Age: 35
Setting detail: RECURRING SERIES
Discharge: HOME | End: 2025-02-22
Payer: COMMERCIAL

## 2025-02-19 PROCEDURE — 97112 NEUROMUSCULAR REEDUCATION: CPT

## 2025-02-19 PROCEDURE — 97535 SELF CARE MNGMENT TRAINING: CPT

## 2025-02-19 NOTE — PROGRESS NOTES
PHYSICAL THERAPY - DAILY TREATMENT NOTE (updated 3/23)      Date: 2025          Patient Name:  Mian Asher :  1990   Medical   Diagnosis:  Pelvic floor dysfunction [M62.89] Treatment Diagnosis:  M62.89  OTHER SPECIFIED DISORDERS OF MUSCLE    Referral Source:  Mary Noel MD Insurance:   Payor: ALLIED BENEFIT SYSTEM / Plan: ALLIED BENEFIT SYSTEM / Product Type: *No Product type* /                     Patient  verified yes     Visit #   Current  / Total 15 12   Time   In / Out 1115 1200   Total Treatment Time 45   Total Timed Codes 45         SUBJECTIVE    Pain Level (0-10 scale): 0    Any medication changes, allergies to medications, adverse drug reactions, diagnosis change, or new procedure performed?: [x] No    [] Yes (see summary sheet for update)  Medications: Verified on Patient Summary List    Subjective functional status/changes:       20 week scan on , awaiting amniocentesis results due to abdnormal genetic testing.  Experiencing hip and LBP with sitting.     OBJECTIVE      Therapeutic Procedures:  Tx Min Billable or 1:1 Min (if diff from Tx Min) Procedure, Rationale, Specifics   30  66356 Self Care/Home Management (timed):  improve patient knowledge and understanding of home injury/symptom/pain management, positioning, posture/ergonomics, activity modification, and transfer techniques  to improve patient's ability to progress to PLOF and address remaining functional goals.  (see flow sheet as applicable)        Details if applicable:  scar ,mobility, k-taping   15  31232 Neuromuscular Re-Education (timed):  improve balance, coordination, kinesthetic sense, posture, core stability and proprioception to improve patient's ability to develop conscious control of individual muscles and awareness of position of extremities in order to progress to PLOF and address remaining functional goals. (see flow sheet as applicable)    Details if applicable:      45 45    Total Total

## 2025-02-20 ENCOUNTER — TELEPHONE (OUTPATIENT)
Age: 35
End: 2025-02-20

## 2025-02-20 ENCOUNTER — HOSPITAL ENCOUNTER (OUTPATIENT)
Facility: HOSPITAL | Age: 35
Setting detail: RECURRING SERIES
End: 2025-02-20
Payer: COMMERCIAL

## 2025-02-20 LAB
CELLS ANALYZED AMN: NORMAL
CELLS COUNTED AMN: NORMAL
CHR 13+18+21+X+Y ANEUP AMN/CVS FISH: NORMAL
CHROM ANALY INTERPHASE AMN FISH-IMP: NORMAL
CLINICAL CYTOGENETICIST SPEC: NORMAL
SPECIMEN SOURCE: NORMAL

## 2025-02-20 NOTE — TELEPHONE ENCOUNTER
Called patient to check in on her after amniocentesis. She is overall feeling well. She does report early fetal movements. She had mild cramping which is now improved.    Mary Noel MD

## 2025-02-21 ENCOUNTER — TELEPHONE (OUTPATIENT)
Age: 35
End: 2025-02-21

## 2025-02-21 NOTE — TELEPHONE ENCOUNTER
I contact the patient, Mian Asher, today over the phone to review her preliminary FISH results and afAFP results from amniocentesis performed 2/18/25.    Fluorescence in situ hybridization (FISH) analysis of uncultured amniocytes revealed two hybridization signals for chromosomes X, 13, 18, and 21. These results are consistent with a female fetus with no aneuploidy for chromosomes X, 13, 18, or 21. The limitations of FISH testing in the context of potential mosaicism were emphasized again today, and we reviewed that while normal FISH results rule out non-mosaic forms of Down syndrome, trisomy 18, trisomy 13, Armstrong syndrome and sex chromosomal aneuploidies, a normal FISH results does NOT mean that the final karyotype and microarray results will also be normal.    Final karyotype and microarray results are pending and results are expected in 1-2 weeks from sample collection date (2/18/25). Maternal cell contamination studies are also pending.    Additionally, the amniotic fluid AFP value is NOT ELEVATED for the gestational age provided.    The patient verbalized her understanding of the information as it was presented to her today; she denies any further questions or concerns at this time.    Tiffanie Deshpande MS, [facility]  Licensed, Certified Genetic Counselor

## 2025-02-27 LAB
AFP ADJ MOM AMN: 1.6
AFP AMN-MCNC: 21.5 UG/ML
AFP AMNIOTIC FLUID: NORMAL
AFP INTERP AMN-IMP: NORMAL
CELLS ANALYZED AMN: 15
CELLS COUNTED AMN: 15
CELLS KARYOTYPED.TOTAL AMN: 2
CHROM ANALY OVERALL INTERP-IMP: NORMAL
CHROM ANALY RESULT (ISCN): NORMAL
CLINICAL CYTOGENETICIST SPEC: NORMAL
COLONIES COUNTED AMN: 15
GA (WEEKS): 16 WK
GENOMIC SOURCE CLASS: NORMAL
ISCN BAND LEVEL AMN QL: 450
LAB DIRECTOR NAME PROVIDER: NORMAL

## 2025-03-03 ENCOUNTER — TELEPHONE (OUTPATIENT)
Age: 35
End: 2025-03-03

## 2025-03-03 NOTE — TELEPHONE ENCOUNTER
I spoke to the patient, Mian Asher, today over the phone to review her fetal karyotype results from amniocentesis performed 2/18/25.     Mian previously elected amniocentesis after an atypical finding involving the X chromosome was found on NIPT. Her preliminary FISH results were normal. Today we reviewed that cytogenetic analysis of cultured amniocytes has revealed a FEMALE karyotype with an apparently normal GTG banding pattern in all in-situ colonies or subcultured metaphases analyzed. This result does not exclude the possibility of subtle rearrangements below the resolution of cytogenetics or congenital anomalies due to other etiologies.     We reviewed that the maternal cell contamination (detention) studies are still pending. While the fluid collected was clear and overall concern for detention is low, we reviewed that with a normal female karyotype we cannot say for certain until detention results confirm that the result if from the fetus and not from the patient.    Additionally, the fetal microarray is pending to look for smaller deletions or duplications on the X chromosome that might explain this NIPT result.    The patient verbalized her understanding of the information as it was presented to her today; she denies any further questions or concerns at this time.    Tiffanie Deshpande MS, [facility]  Licensed, Certified Genetic Counselor

## 2025-03-03 NOTE — TELEPHONE ENCOUNTER
I attempted to contact the patient, Mian Asher, today over the phone to review the normal fetal karyotype results from her recent amniocentesis. The patient did not answer. I left a voicemail requesting a call back at her convenience.    Tiffanie Deshpande MS, [facility]  Licensed, Certified Genetic Counselor

## 2025-03-07 ENCOUNTER — TELEPHONE (OUTPATIENT)
Age: 35
End: 2025-03-07

## 2025-03-07 NOTE — TELEPHONE ENCOUNTER
The patient, Mian Asher, called in today to get an updated ETA on her fetal chromosomal microarray results. [facility] stated that their updated turnaround time for the CMA is actually 3 weeks, not 2. Updated patient via [employer] re: results next week or early the week after.    The patient verbalized her understanding of the information as it was presented to her today; she denies any further questions or concerns at this time.    Tiffanie Deshpande MS, [facility]  Licensed, Certified Genetic Counselor

## 2025-03-12 DIAGNOSIS — Z34.90 PREGNANCY, UNSPECIFIED GESTATIONAL AGE: Primary | ICD-10-CM

## 2025-03-13 ENCOUNTER — ROUTINE PRENATAL (OUTPATIENT)
Age: 35
End: 2025-03-13

## 2025-03-13 VITALS
OXYGEN SATURATION: 94 % | WEIGHT: 214.6 LBS | RESPIRATION RATE: 16 BRPM | TEMPERATURE: 97.8 F | HEART RATE: 76 BPM | DIASTOLIC BLOOD PRESSURE: 75 MMHG | SYSTOLIC BLOOD PRESSURE: 120 MMHG | BODY MASS INDEX: 31.69 KG/M2

## 2025-03-13 VITALS — HEART RATE: 91 BPM | SYSTOLIC BLOOD PRESSURE: 119 MMHG | DIASTOLIC BLOOD PRESSURE: 74 MMHG

## 2025-03-13 DIAGNOSIS — Z34.90 PREGNANCY, UNSPECIFIED GESTATIONAL AGE: Primary | ICD-10-CM

## 2025-03-13 DIAGNOSIS — Z34.90 PREGNANCY, UNSPECIFIED GESTATIONAL AGE: ICD-10-CM

## 2025-03-13 PROCEDURE — 0502F SUBSEQUENT PRENATAL CARE: CPT | Performed by: OBSTETRICS & GYNECOLOGY

## 2025-03-13 NOTE — PROGRESS NOTES
Subjective: Doing well. Good FM. Denies VB, LOF.    /75 (BP Site: Right Upper Arm, Patient Position: Sitting, BP Cuff Size: Large Adult)   Pulse 76   Temp 97.8 °F (36.6 °C) (Oral)   Resp 16   Wt 97.3 kg (214 lb 9.6 oz)   LMP 10/24/2024   SpO2 94%   BMI 31.69 kg/m²     Prenatal Fetal Information  Fetal HR: MFM  Movement: Absent    A/P  Mian Asher is a 34 y.o.  at 20w0d with pregnancy complicated by:   - Planning repeat LTCS at 39w1d on . 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 On Keppra 1500 mg nightly  - Folic acid 1 mg daily  - 3T growth scan, BPPs in 3T    History of prior c/s due to arrest at 7 cm, 8 # 3 oz  - Short interval pregnancy (13 months from delivery to conception)  - Desires repeat at 39 weeks, TOLAC if spontaneously labors prior    History of pre-eclampsia  - Baby aspirin  - Baseline pre-E labs: UPC 0.1, AST 6/ALT 13, Cr 0.66, Plt 260, 12.2/36.6    AMA  - Baby aspirin  - BPPs in 3T    Abnormal NIPT (Suspicious for Armstrong syndrome)  - S/p amniocentesis on 2025    Routine OB:  - PNLs: A+/absc neg, PNLs normal. G/C/T neg Urine cx NG  - Genetic Screening: Atypical sex finding, Girl, Referred to [facility]  - Anatomy scan: Normal female anatomy, anterior placenta, follow up right hand/fingers, palate  - Vaccines: [ ]  Flu [ ] Tdap  - 1 hr GTT:   - Third Tri Labs:   - GBS:   - Fetal presentation:    Pain mgmt. 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 Daughter Mary Eugene born 2023!          Mary Noel MD

## 2025-03-13 NOTE — PROGRESS NOTES
Mian Asher is a 34 y.o.  at 20w0d    Patient states she is doing well.    Patient reports Negative FM.      1. Have you been to the ER, urgent care clinic, or hospitalized since your last visit? No  2. Have you seen or consulted any other health care providers outside of the [facility] since your last visit? No      She declines  a chaperone during the gynecologic exam today.

## 2025-03-13 NOTE — PROGRESS NOTES
Patient was seen 3/13/2025      Please look under media to view full consult and ultrasound report in View[employer].         Vidya Hardin MD   Maternal Fetal Medicine

## 2025-03-14 LAB
# OF GENOTYPING TARGETS: NORMAL
AFP ADJ MOM AMN: 1.6
AFP AMN-MCNC: 21.5 UG/ML
AFP AMNIOTIC FLUID: NORMAL
AFP INTERP AMN-IMP: NORMAL
ARRAY TYPE: NORMAL
CELLS ANALYZED AMN: 15
CELLS COUNTED AMN: 15
CELLS KARYOTYPED.TOTAL AMN: 2
CHROM ANALY OVERALL INTERP-IMP: NORMAL
CHROM ANALY RESULT (ISCN): NORMAL
CHROMOSOME BLD/T: NORMAL
CLINICAL CYTOGENETICIST SPEC: NORMAL
CLINICAL CYTOGENETICIST SPEC: NORMAL
COLONIES COUNTED AMN: 15
DIAGNOSTIC IMP SPEC-IMP: NORMAL
GA (WEEKS): 16 WK
GENE XXX MUT ANL BLD/T: NORMAL
GENOMIC SOURCE CLASS: NORMAL
ISCN BAND LEVEL AMN QL: 450
LAB DIRECTOR NAME PROVIDER: NORMAL
LAB DIRECTOR NAME PROVIDER: NORMAL
SPECIMEN SOURCE: NORMAL
SPECIMEN SOURCE: NORMAL

## 2025-03-17 ENCOUNTER — TELEPHONE (OUTPATIENT)
Age: 35
End: 2025-03-17

## 2025-03-17 NOTE — TELEPHONE ENCOUNTER
I spoke to the patient, Mian Asher, today over the phone to review her final fetal chromosomal microarray (CMA) and maternal cell contamination (custodial) results.     Mian previously elected an amniocentesis after an atypical finding on NIPT involving the X chromosome; initial FISH and karyotype results were negative. Today we reviewed that the whole genome chromosome SNP microarray (Reveal) analysis was normal. No significant DNA copy number changes or copy neutral regions were detected. No maternal cell contamination (custodial) was detected, indicating a normal, female fetus: arr(X,1-22)x2.     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 In cases where there is a chromosome abnormality confined to the placenta (mosaic or non-mosaic) it has been debated as to whether this will have any implications on placental function. Some reports inferred an association with pregnancy loss and IUGR due to the placental karyotypic defect.  Other studies have produced conflicting conclusions.  The most recent studies revealed that for most trisomies, a placenta that is trisomic in part or in whole usually retains a sufficient or nearly sufficient level of function, and as a general rule the chromosomally normal fetus is satisfactorily supported. Pregnancies with known or suspected CPM however may be sonographically monitored to rule out IUGR.     The patient verbalized her understanding of the information as it was presented to her today; she denies any further questions or concerns at this time.    Tiffanie Deshpande MS, [facility]  Licensed, Certified Genetic Counselor

## 2025-04-03 DIAGNOSIS — Z34.90 PREGNANCY, UNSPECIFIED GESTATIONAL AGE: Primary | ICD-10-CM

## 2025-04-08 ENCOUNTER — HOSPITAL ENCOUNTER (OUTPATIENT)
Facility: HOSPITAL | Age: 35
Setting detail: RECURRING SERIES
Discharge: HOME | End: 2025-04-11
Payer: COMMERCIAL

## 2025-04-08 PROCEDURE — 97112 NEUROMUSCULAR REEDUCATION: CPT

## 2025-04-08 PROCEDURE — 97535 SELF CARE MNGMENT TRAINING: CPT

## 2025-04-08 NOTE — PROGRESS NOTES
abnormalities to address functional deficits and attain remaining goals.    Progress toward goals / Updated goals:  [x]  See Progress Note/Recertification    Continues to make progress towards all pain and strength goals    PLAN  YES  Continue plan of care  Re-Cert Due: n/a  []  Upgrade activities as tolerated  []  Discharge due to:  []  Other:      Marco Watts, PT       4/8/2025       2:17 PM

## 2025-04-10 ENCOUNTER — LAB (OUTPATIENT)
Age: 35
End: 2025-04-10

## 2025-04-10 ENCOUNTER — ROUTINE PRENATAL (OUTPATIENT)
Age: 35
End: 2025-04-10

## 2025-04-10 VITALS
HEART RATE: 101 BPM | DIASTOLIC BLOOD PRESSURE: 72 MMHG | TEMPERATURE: 98.3 F | WEIGHT: 217 LBS | SYSTOLIC BLOOD PRESSURE: 109 MMHG | BODY MASS INDEX: 32.05 KG/M2 | RESPIRATION RATE: 17 BRPM | OXYGEN SATURATION: 97 %

## 2025-04-10 VITALS — HEART RATE: 100 BPM | OXYGEN SATURATION: 97 % | SYSTOLIC BLOOD PRESSURE: 132 MMHG | DIASTOLIC BLOOD PRESSURE: 77 MMHG

## 2025-04-10 DIAGNOSIS — Z34.90 PREGNANCY, UNSPECIFIED GESTATIONAL AGE: ICD-10-CM

## 2025-04-10 DIAGNOSIS — O99.210 OBESITY AFFECTING PREGNANCY, ANTEPARTUM, UNSPECIFIED OBESITY TYPE: ICD-10-CM

## 2025-04-10 DIAGNOSIS — O26.90 PREGNANCY COMPLICATION, ANTEPARTUM: Primary | ICD-10-CM

## 2025-04-10 DIAGNOSIS — Z34.90 PREGNANCY, UNSPECIFIED GESTATIONAL AGE: Primary | ICD-10-CM

## 2025-04-10 LAB
ERYTHROCYTE [DISTWIDTH] IN BLOOD BY AUTOMATED COUNT: 12.7 % (ref 11.5–14.5)
FERRITIN SERPL-MCNC: 53 NG/ML (ref 8–252)
GLUCOSE 1H P 100 G GLC PO SERPL-MCNC: 156 MG/DL (ref 65–140)
HCT VFR BLD AUTO: 32.8 % (ref 35–47)
HGB BLD-MCNC: 11 G/DL (ref 11.5–16)
MCH RBC QN AUTO: 30.4 PG (ref 26–34)
MCHC RBC AUTO-ENTMCNC: 33.5 G/DL (ref 30–36.5)
MCV RBC AUTO: 90.6 FL (ref 80–99)
NRBC # BLD: 0 K/UL (ref 0–0.01)
NRBC BLD-RTO: 0 PER 100 WBC
PLATELET # BLD AUTO: 226 K/UL (ref 150–400)
PMV BLD AUTO: 10.3 FL (ref 8.9–12.9)
RBC # BLD AUTO: 3.62 M/UL (ref 3.8–5.2)
WBC # BLD AUTO: 10.4 K/UL (ref 3.6–11)

## 2025-04-10 PROCEDURE — 0502F SUBSEQUENT PRENATAL CARE: CPT | Performed by: OBSTETRICS & GYNECOLOGY

## 2025-04-10 NOTE — PROCEDURES
appropriate distance from the internal os.  Cephalic presentation.    Previously, the fetal anatomy survey was incomplete. Today, the remaining structures were visualized and appear to be normal within the limitations of ultrasound  technology. The ultrasound findings as listed above and diagnostic limitations of ultrasound imaging, including inability to exclude all anomalies, have been reviewed with  the patient. All questions and concerns addressed.    Consultation  ==========    JONATHON is 34 yrs of age, G1 at 24w 0d.    Patient was seen on 2/18 for an amniocentesis due to abnormal NIPT that resulted with atypical finding involving the X chromosome.  - sp GC counseling on 1/30 and 2/17  - normal amnio 2/18  - fetal echo not indicated at this time;    Maternal Obesity (Pre-Pregnancy BMI 31):  - Recommend growth scan at 32 weeks    Maternal Seizure Disorder  - patient has a history of hormone mediated seizures  - states last seizure (grand mal) was in 2014  - In prev preg she was started on Keppra for prophylaxis and per neuro plan was to do same this preg. She is currently stable on Keppra. Patient has not had levels drawn  this pregnancy. She discussed that her previous pregnancy they did draw levels and I recommend that we get Keppra levels at least every trimester of this pregnancy.  Patient follows with her neurologist regularly and the next appointment is currently scheduled in November however patient will reach out to discuss obtaining an updated  Keppra level    Advanced Maternal Age:  - Growth scan at 32 weeks  - Once Weekly Antepartum Testing starting at 37 weeks  - Delivery recommended b/w 39.0 and 40.6    Recommendations  ==============    return at 32 weeks for growth    Growth: q4 at 32  ANT: weekly at 37  Del: 39.0-40.6    Coding  ======    Code: 25229  Description: Ultrasound, pregnant uterus, real time with image documentation, follow up, transabdominal approach per fetus

## 2025-04-10 NOTE — PROGRESS NOTES
Patient was seen 4/10/2025      Please look under media to view full consult and ultrasound report in View[employer].         Vidya Hardin MD   Maternal Fetal Medicine

## 2025-04-10 NOTE — PROGRESS NOTES
Mian Asher is a 34 y.o.  at 24w0d      Patient reports Positive FM.      1. Have you been to the ER, urgent care clinic, or hospitalized since your last visit? No  2. Have you seen or consulted any other health care providers outside of the [facility] since your last visit? No      She declines  a chaperone during the gynecologic exam today.

## 2025-04-10 NOTE — PROGRESS NOTES
Subjective: Doing well. Good FM. Denies VB, LOF.    /72 (BP Site: Right Upper Arm, Patient Position: Sitting, BP Cuff Size: Large Adult)   Pulse (!) 101   Temp 98.3 °F (36.8 °C) (Oral)   Resp 17   Wt 98.4 kg (217 lb)   LMP 10/24/2024   SpO2 97%   BMI 32.05 kg/m²     Prenatal Fetal Information  Fetal HR: 150  Movement: Present    A/P  Mian Asher is a 34 y.o.  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 On Keppra 1500 mg nightly  - Folic acid 1 mg daily  - 3T growth scan, BPPs in 3T    History of prior c/s due to arrest at 7 cm, 8 # 3 oz  - Short interval pregnancy (13 months from delivery to conception)  - Desires repeat at 39 weeks, TOLAC if spontaneously labors prior    History of pre-eclampsia  - Baby aspirin  - Baseline pre-E labs: UPC 0.1, AST 6/ALT 13, Cr 0.66, Plt 260, 12.2/36.6    AMA  - Baby aspirin  - BPPs in 3T    Abnormal NIPT (Suspicious for Armstrong syndrome)  - S/p amniocentesis on 2025    Routine OB:  - PNLs: A+/absc neg, PNLs normal. G/C/T neg Urine cx NG  - Genetic Screening: Atypical sex finding, Girl, Referred to [facility]  - Anatomy scan: Normal female anatomy, anterior placenta, follow up right hand/fingers, palate  - Vaccines: [ ]  Flu [ ] Tdap  - 1 hr GTT:   - Third Tri Labs:   - GBS:   - Fetal presentation:    Pain mgmt. 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 Daughter Mary Eugene born 2023!          Mary Noel MD

## 2025-04-11 ENCOUNTER — LAB (OUTPATIENT)
Age: 35
End: 2025-04-11

## 2025-04-11 ENCOUNTER — RESULTS FOLLOW-UP (OUTPATIENT)
Age: 35
End: 2025-04-11

## 2025-04-11 DIAGNOSIS — Z34.90 PREGNANCY, UNSPECIFIED GESTATIONAL AGE: ICD-10-CM

## 2025-04-11 DIAGNOSIS — Z34.90 PREGNANCY, UNSPECIFIED GESTATIONAL AGE: Primary | ICD-10-CM

## 2025-04-11 LAB
GESTATIONAL 3HR GTT: ABNORMAL
GLUCOSE 1H P 100 G GLC PO SERPL-MCNC: 177 MG/DL (ref 65–180)
GLUCOSE 2 HOUR: 180 MG/DL (ref 65–155)
GLUCOSE P FAST SERPL-MCNC: 81 MG/DL (ref 65–95)
GLUCOSE, 3 HOUR: 142 MG/DL (ref 65–140)
T PALLIDUM AB SER QL IA: NON REACTIVE

## 2025-04-12 ENCOUNTER — RESULTS FOLLOW-UP (OUTPATIENT)
Age: 35
End: 2025-04-12

## 2025-04-12 RX ORDER — GLUCOSAMINE HCL/CHONDROITIN SU 500-400 MG
CAPSULE ORAL
Qty: 120 STRIP | Refills: 2 | Status: SHIPPED | OUTPATIENT
Start: 2025-04-12

## 2025-04-12 RX ORDER — AVOBENZONE, HOMOSALATE, OCTISALATE, OCTOCRYLENE 30; 40; 45; 26 MG/ML; MG/ML; MG/ML; MG/ML
1 CREAM TOPICAL DAILY
Qty: 100 EACH | Refills: 5 | Status: SHIPPED | OUTPATIENT
Start: 2025-04-12

## 2025-04-12 RX ORDER — BLOOD-GLUCOSE METER
1 EACH MISCELLANEOUS 4 TIMES DAILY
Qty: 1 EACH | Refills: 0 | Status: SHIPPED | OUTPATIENT
Start: 2025-04-12

## 2025-04-14 DIAGNOSIS — O24.419 GDM (GESTATIONAL DIABETES MELLITUS): ICD-10-CM

## 2025-04-16 LAB
# OF GENOTYPING TARGETS: NORMAL
AFP ADJ MOM AMN: 1.6
AFP AMN-MCNC: 21.5 UG/ML
AFP AMNIOTIC FLUID: NORMAL
AFP INTERP AMN-IMP: NORMAL
ARRAY TYPE: NORMAL
CELLS ANALYZED AMN: 15
CELLS ANALYZED AMN: NORMAL
CELLS COUNTED AMN: 15
CELLS COUNTED AMN: NORMAL
CELLS KARYOTYPED.TOTAL AMN: 2
CHR 13+18+21+X+Y ANEUP AMN/CVS FISH: NORMAL
CHROM ANALY INTERPHASE AMN FISH-IMP: NORMAL
CHROM ANALY OVERALL INTERP-IMP: NORMAL
CHROM ANALY RESULT (ISCN): NORMAL
CHROMOSOME BLD/T: NORMAL
CLINICAL CYTOGENETICIST SPEC: NORMAL
COLONIES COUNTED AMN: 15
DIAGNOSTIC IMP SPEC-IMP: NORMAL
GA (WEEKS): 16 WK
GENE XXX MUT ANL BLD/T: NORMAL
GENOMIC SOURCE CLASS: NORMAL
ISCN BAND LEVEL AMN QL: 450
LAB DIRECTOR NAME PROVIDER: NORMAL
LAB DIRECTOR NAME PROVIDER: NORMAL
SPECIMEN SOURCE: NORMAL

## (undated) DEVICE — CATH 27695 EDM VENT TRANS W/STRIPE 35CM

## (undated) DEVICE — SOLUTION IRRIG 1000ML STRL H2O USP PLAS POUR BTL

## (undated) DEVICE — SOLUTION IRRIG 1000ML 0.9% SOD CHL USP POUR PLAS BTL

## (undated) DEVICE — DRESSING SIL W4XL5IN ANTIBACT GELLING FBR CYTOFORM

## (undated) DEVICE — PACK PROCEDURE SURG C SECT KT SMH

## (undated) DEVICE — ATTACHMENT SMK 3/8INX10FT VALLEYLAB

## (undated) DEVICE — SUTURE VCRL SZ 0 L36IN ABSRB VLT L40MM CT 1/2 CIR J358H

## (undated) DEVICE — SUTURE PDS II SZ 0 L36IN ABSRB VLT L40MM CT 1/2 CIR Z358T

## (undated) DEVICE — ROYALSILK SURGICAL GOWN, L: Brand: CONVERTORS

## (undated) DEVICE — LIGHT HANDLE: Brand: DEVON

## (undated) DEVICE — Z INACTIVE UOM CHANGE USE 2863033 STICK SPONGE WET 8INL PVP PAINT PRESAT STER

## (undated) DEVICE — ELECTRODE PT RET AD L9FT HI MOIST COND ADH HYDRGEL CORDED

## (undated) DEVICE — SUTURE MCRYL SZ 4-0 L27IN ABSRB UD L24MM PS-1 3/8 CIR PRIM Y935H

## (undated) DEVICE — DEVON™ KNEE AND BODY STRAP 60" X 3" (1.5 M X 7.6 CM): Brand: DEVON

## (undated) DEVICE — SOLIDIFIER FLUID 3000 CC ABSORB

## (undated) DEVICE — APPLICATOR MEDICATED 26 CC SOLUTION HI LT ORNG CHLORAPREP

## (undated) DEVICE — KENDALL SCD EXPRESS SLEEVES, KNEE LENGTH, MEDIUM: Brand: KENDALL SCD